# Patient Record
Sex: MALE | Race: WHITE | NOT HISPANIC OR LATINO | Employment: UNEMPLOYED | URBAN - NONMETROPOLITAN AREA
[De-identification: names, ages, dates, MRNs, and addresses within clinical notes are randomized per-mention and may not be internally consistent; named-entity substitution may affect disease eponyms.]

---

## 2018-06-29 ENCOUNTER — HOSPITAL ENCOUNTER (EMERGENCY)
Facility: HOSPITAL | Age: 48
Discharge: HOME | End: 2018-06-29
Attending: EMERGENCY MEDICINE | Admitting: EMERGENCY MEDICINE

## 2018-06-29 VITALS
TEMPERATURE: 97.5 F | HEART RATE: 86 BPM | OXYGEN SATURATION: 96 % | RESPIRATION RATE: 18 BRPM | SYSTOLIC BLOOD PRESSURE: 141 MMHG | BODY MASS INDEX: 24.38 KG/M2 | HEIGHT: 72 IN | WEIGHT: 180 LBS | DIASTOLIC BLOOD PRESSURE: 93 MMHG

## 2018-06-29 DIAGNOSIS — L03.113 CELLULITIS OF RIGHT UPPER EXTREMITY: Primary | ICD-10-CM

## 2018-06-29 PROCEDURE — 99283 EMERGENCY DEPT VISIT LOW MDM: CPT

## 2018-06-29 RX ORDER — MONTELUKAST SODIUM 10 MG/1
10 TABLET ORAL NIGHTLY
COMMUNITY
End: 2022-01-18

## 2018-06-29 RX ORDER — OMEPRAZOLE 40 MG/1
40 CAPSULE, DELAYED RELEASE ORAL DAILY
COMMUNITY
End: 2022-01-18

## 2018-06-29 RX ORDER — MELOXICAM 15 MG/1
15 TABLET ORAL DAILY
COMMUNITY
End: 2022-01-18

## 2018-06-29 RX ORDER — RANITIDINE 150 MG/1
150 TABLET ORAL 2 TIMES DAILY
COMMUNITY
End: 2022-01-18

## 2018-06-29 RX ORDER — DOXYCYCLINE 100 MG/1
100 CAPSULE ORAL 2 TIMES DAILY
Qty: 20 CAPSULE | Refills: 0 | Status: SHIPPED | OUTPATIENT
Start: 2018-06-29 | End: 2018-07-09

## 2018-06-29 RX ORDER — ALBUTEROL SULFATE 90 UG/1
2 AEROSOL, METERED RESPIRATORY (INHALATION) EVERY 4 HOURS PRN
COMMUNITY
End: 2019-11-02 | Stop reason: SDUPTHER

## 2018-06-29 RX ORDER — BRIMONIDINE TARTRATE AND TIMOLOL MALEATE 2; 5 MG/ML; MG/ML
SOLUTION OPHTHALMIC EVERY 12 HOURS
COMMUNITY
End: 2022-01-18

## 2018-09-13 DIAGNOSIS — R06.02 SOB (SHORTNESS OF BREATH): Primary | ICD-10-CM

## 2018-09-17 DIAGNOSIS — R06.02 SOB (SHORTNESS OF BREATH): Primary | ICD-10-CM

## 2018-09-20 ENCOUNTER — OFFICE VISIT (OUTPATIENT)
Dept: PULMONOLOGY | Facility: CLINIC | Age: 48
End: 2018-09-20

## 2018-09-20 VITALS
WEIGHT: 178 LBS | OXYGEN SATURATION: 98 % | BODY MASS INDEX: 24.11 KG/M2 | HEIGHT: 72 IN | SYSTOLIC BLOOD PRESSURE: 120 MMHG | HEART RATE: 82 BPM | DIASTOLIC BLOOD PRESSURE: 79 MMHG

## 2018-09-20 DIAGNOSIS — J44.9 CHRONIC OBSTRUCTIVE PULMONARY DISEASE, UNSPECIFIED COPD TYPE (HCC): Primary | ICD-10-CM

## 2018-09-20 LAB
FEV1: 0.88 LITERS
FVC VOL RESPIRATORY: 1.31 LITERS

## 2018-09-20 PROCEDURE — 94010 BREATHING CAPACITY TEST: CPT | Performed by: INTERNAL MEDICINE

## 2018-09-20 PROCEDURE — 99203 OFFICE O/P NEW LOW 30 MIN: CPT | Performed by: INTERNAL MEDICINE

## 2018-09-20 ASSESSMENT — PULMONARY FUNCTION TESTS
FEV1: 0.88
FVC: 1.31

## 2018-09-20 NOTE — PROGRESS NOTES
History of Present Illness 48-year-old male Referred for evaluation of COPD He came along with his wife smoking up to 2 packs a day For the over 30 years he was given oxygen 3 month ago and has been taking Singular Albuterol inhaler and Combivent they Don't seem to be helping.  Taking Lopressor for blood pressure and Prilosec for acid reflux      Review of Systems daily cough some expectoration shortness of breath even at rest no chest pain palpitation and weight loss or edema    Active Problems:  Problem List Items Addressed This Visit     None      Visit Diagnoses     Chronic obstructive pulmonary disease, unspecified COPD type (CMS/HCC)    -  Primary    Relevant Medications    ipratropium-albuterol (COMBIVENT RESPIMAT)  MCG/ACT inhaler          Past Medical History:  Past Medical History:   Diagnosis Date   • Hypertension    • Seizures (CMS/HCC)        Family History:  History reviewed. No pertinent family history.    Social History:  Social History   Substance Use Topics   • Smoking status: Current Every Day Smoker     Packs/day: 1.00     Years: 35.00     Types: Cigarettes   • Smokeless tobacco: Never Used   • Alcohol use No       Current Medications:  Current Outpatient Prescriptions   Medication Sig Dispense Refill   • albuterol (PROVENTIL HFA;VENTOLIN HFA) 108 (90 Base) MCG/ACT inhaler Inhale 2 puffs Every 4 (Four) Hours As Needed for Wheezing.     • brimonidine-timolol (COMBIGAN) 0.2-0.5 % ophthalmic solution Every 12 (Twelve) Hours.     • ipratropium-albuterol (COMBIVENT RESPIMAT)  MCG/ACT inhaler Inhale 1 puff 4 (Four) Times a Day As Needed for Wheezing.     • meloxicam (MOBIC) 15 MG tablet Take 15 mg by mouth Daily.     • metoprolol tartrate (LOPRESSOR) 25 MG tablet Take 25 mg by mouth 2 (Two) Times a Day.     • montelukast (SINGULAIR) 10 MG tablet Take 10 mg by mouth Every Night.     • omeprazole (priLOSEC) 40 MG capsule Take 40 mg by mouth Daily.     • raNITIdine (ZANTAC) 150 MG tablet Take  "150 mg by mouth 2 (Two) Times a Day.       No current facility-administered medications for this visit.        Allergies:  Allergies   Allergen Reactions   • Penicillins Anaphylaxis       Vitals:  /79   Pulse 82   Ht 182.9 cm (72\")   Wt 80.7 kg (178 lb)   SpO2 98%   BMI 24.14 kg/m²     Physical Exam no acute distress vital signs is stable O2 sat 98% on room air lungs hyperinflated with decreased breath sounds heart regular no clubbing cyanosis or edema    Imaging: Chest x-ray before the visit and sent him to get a chest x-ray instructed the wife that we need to get an x-ray to make sure that he does not of lung cancer    PFT: Question of effort however FVC is 24 and FEV1 20% indicating severe obstruction  Results for orders placed or performed during the hospital encounter of 10/31/15   Sedimentation rate   Result Value Ref Range    Sed Rate 22 (H) 0 - 15 mm/hr   C-reactive protein   Result Value Ref Range    C-Reactive Protein 2.93 (H) 0.00 - 0.99 mg/dL   CBC and Differential   Result Value Ref Range    WBC 10.4 4.5 - 12.5 K/Cumm    RBC 4.50 (L) 4.70 - 6.10 Million    Hemoglobin 13.9 (L) 14.0 - 18.0 g/dL    Hematocrit 42.6 42.0 - 52.0 %    MCV 94.7 (H) 80.0 - 94.0 fL    MCH 30.9 27.0 - 33.0 pg    MCHC 32.6 (L) 33.0 - 37.0 g/dL    Platelets 167 130 - 400 K/Cumm    RDW 13.4 11.5 - 14.5 %    MPV 12.6 (H) 6.0 - 10.0 fL    Neutrophil Rel % 62.6 30.0 - 70.0 %    Lymphocyte Rel % 28.3 21.0 - 51.0 %    Monocyte Rel % 5.9 0.0 - 10.0 %    Eosinophil Rel % 2.7 0.0 - 5.0 %    Basophil Rel % 0.4 0.0 - 2.0 %    Immature Granulocyte Rel % 0.10 0.00 - 0.43 %    Neutrophils Absolute 6.5 1.4 - 6.5 K/Cumm    Lymphocytes Absolute 2.9 1.0 - 3.0 K/Cumm    Monocytes Absolute 0.6 0.1 - 0.9 K/Cumm    Eosinophils Absolute 0.3 0.0 - 0.7 K/Cumm    Basophils Absolute 0.0 0.0 - 0.3 K/Cumm    Abs Imm Gran 0.010 0.000 - 0.031 K/Cumm           ASSESSMENT AND DIAGNOSIS: COPD seemingly severe  Recommendation strongly urged him to quit " smoking he currently smokes 1 pack a day with a prescription for NicoDerm patches take 21 mg once a day Ultram for singular theophylline 600 Symbicort and incruse and albuterol inhaler.  Instructed him to get annual flu vaccine and pneumonia vaccine per protocol .  The wife is also a smoker and does not seem to be Motivated to quit or be  supportive of her  to quit.      Follow-Up: Return in 1 week and with a PFT and to review the Chest x-ray and reemphasized the need for smoking cessation

## 2019-09-21 ENCOUNTER — HOSPITAL ENCOUNTER (EMERGENCY)
Facility: HOSPITAL | Age: 49
Discharge: HOME | End: 2019-09-21
Attending: FAMILY MEDICINE | Admitting: FAMILY MEDICINE

## 2019-09-21 ENCOUNTER — APPOINTMENT (OUTPATIENT)
Dept: CT IMAGING | Facility: HOSPITAL | Age: 49
End: 2019-09-21

## 2019-09-21 ENCOUNTER — APPOINTMENT (OUTPATIENT)
Dept: GENERAL RADIOLOGY | Facility: HOSPITAL | Age: 49
End: 2019-09-21

## 2019-09-21 VITALS
HEIGHT: 68 IN | TEMPERATURE: 99.4 F | OXYGEN SATURATION: 97 % | WEIGHT: 162 LBS | RESPIRATION RATE: 20 BRPM | BODY MASS INDEX: 24.55 KG/M2 | DIASTOLIC BLOOD PRESSURE: 72 MMHG | SYSTOLIC BLOOD PRESSURE: 119 MMHG | HEART RATE: 85 BPM

## 2019-09-21 DIAGNOSIS — R03.0 ELEVATED BLOOD PRESSURE READING: ICD-10-CM

## 2019-09-21 DIAGNOSIS — J20.9 ACUTE BRONCHITIS, UNSPECIFIED ORGANISM: Primary | ICD-10-CM

## 2019-09-21 LAB
ANION GAP SERPL CALCULATED.3IONS-SCNC: 12.8 MMOL/L (ref 5–15)
BASOPHILS # BLD AUTO: 0.04 10*3/MM3 (ref 0–0.2)
BASOPHILS NFR BLD AUTO: 0.2 % (ref 0–1.5)
BUN BLD-MCNC: 12 MG/DL (ref 6–20)
BUN/CREAT SERPL: 18.5 (ref 7–25)
CALCIUM SPEC-SCNC: 9.5 MG/DL (ref 8.6–10.5)
CHLORIDE SERPL-SCNC: 101 MMOL/L (ref 98–107)
CO2 SERPL-SCNC: 23.2 MMOL/L (ref 22–29)
CREAT BLD-MCNC: 0.65 MG/DL (ref 0.76–1.27)
D-LACTATE SERPL-SCNC: 1.3 MMOL/L (ref 0.5–2)
DEPRECATED RDW RBC AUTO: 43.4 FL (ref 37–54)
EOSINOPHIL # BLD AUTO: 0.22 10*3/MM3 (ref 0–0.4)
EOSINOPHIL NFR BLD AUTO: 1.4 % (ref 0.3–6.2)
ERYTHROCYTE [DISTWIDTH] IN BLOOD BY AUTOMATED COUNT: 13.3 % (ref 12.3–15.4)
FLUAV AG NPH QL: NEGATIVE
FLUBV AG NPH QL IA: NEGATIVE
GFR SERPL CREATININE-BSD FRML MDRD: 132 ML/MIN/1.73
GLUCOSE BLD-MCNC: 127 MG/DL (ref 65–99)
HCT VFR BLD AUTO: 40.7 % (ref 37.5–51)
HGB BLD-MCNC: 13.5 G/DL (ref 13–17.7)
IMM GRANULOCYTES # BLD AUTO: 0.05 10*3/MM3 (ref 0–0.05)
IMM GRANULOCYTES NFR BLD AUTO: 0.3 % (ref 0–0.5)
LYMPHOCYTES # BLD AUTO: 2.15 10*3/MM3 (ref 0.7–3.1)
LYMPHOCYTES NFR BLD AUTO: 13.3 % (ref 19.6–45.3)
MCH RBC QN AUTO: 30.9 PG (ref 26.6–33)
MCHC RBC AUTO-ENTMCNC: 33.2 G/DL (ref 31.5–35.7)
MCV RBC AUTO: 93.1 FL (ref 79–97)
MONOCYTES # BLD AUTO: 1.02 10*3/MM3 (ref 0.1–0.9)
MONOCYTES NFR BLD AUTO: 6.3 % (ref 5–12)
NEUTROPHILS # BLD AUTO: 12.66 10*3/MM3 (ref 1.7–7)
NEUTROPHILS NFR BLD AUTO: 78.5 % (ref 42.7–76)
PLATELET # BLD AUTO: 230 10*3/MM3 (ref 140–450)
PMV BLD AUTO: 11.6 FL (ref 6–12)
POTASSIUM BLD-SCNC: 4.3 MMOL/L (ref 3.5–5.2)
RBC # BLD AUTO: 4.37 10*6/MM3 (ref 4.14–5.8)
SODIUM BLD-SCNC: 137 MMOL/L (ref 136–145)
WBC NRBC COR # BLD: 16.14 10*3/MM3 (ref 3.4–10.8)

## 2019-09-21 PROCEDURE — 87804 INFLUENZA ASSAY W/OPTIC: CPT | Performed by: PHYSICIAN ASSISTANT

## 2019-09-21 PROCEDURE — 71046 X-RAY EXAM CHEST 2 VIEWS: CPT | Performed by: RADIOLOGY

## 2019-09-21 PROCEDURE — 25010000002 CEFTRIAXONE: Performed by: PHYSICIAN ASSISTANT

## 2019-09-21 PROCEDURE — 0 IOVERSOL 74 % SOLUTION: Performed by: FAMILY MEDICINE

## 2019-09-21 PROCEDURE — 99283 EMERGENCY DEPT VISIT LOW MDM: CPT

## 2019-09-21 PROCEDURE — 36415 COLL VENOUS BLD VENIPUNCTURE: CPT

## 2019-09-21 PROCEDURE — 87040 BLOOD CULTURE FOR BACTERIA: CPT | Performed by: PHYSICIAN ASSISTANT

## 2019-09-21 PROCEDURE — 83605 ASSAY OF LACTIC ACID: CPT | Performed by: PHYSICIAN ASSISTANT

## 2019-09-21 PROCEDURE — 71275 CT ANGIOGRAPHY CHEST: CPT | Performed by: RADIOLOGY

## 2019-09-21 PROCEDURE — 94799 UNLISTED PULMONARY SVC/PX: CPT

## 2019-09-21 PROCEDURE — 25010000002 METHYLPREDNISOLONE PER 125 MG: Performed by: PHYSICIAN ASSISTANT

## 2019-09-21 PROCEDURE — 71046 X-RAY EXAM CHEST 2 VIEWS: CPT

## 2019-09-21 PROCEDURE — 71275 CT ANGIOGRAPHY CHEST: CPT

## 2019-09-21 PROCEDURE — 94640 AIRWAY INHALATION TREATMENT: CPT

## 2019-09-21 PROCEDURE — 96365 THER/PROPH/DIAG IV INF INIT: CPT

## 2019-09-21 PROCEDURE — 96375 TX/PRO/DX INJ NEW DRUG ADDON: CPT

## 2019-09-21 PROCEDURE — 80048 BASIC METABOLIC PNL TOTAL CA: CPT | Performed by: PHYSICIAN ASSISTANT

## 2019-09-21 PROCEDURE — 85025 COMPLETE CBC W/AUTO DIFF WBC: CPT | Performed by: PHYSICIAN ASSISTANT

## 2019-09-21 RX ORDER — AZITHROMYCIN 250 MG/1
250 TABLET, FILM COATED ORAL DAILY
Qty: 6 TABLET | Refills: 0 | OUTPATIENT
Start: 2019-09-21 | End: 2019-11-02

## 2019-09-21 RX ORDER — SODIUM CHLORIDE 0.9 % (FLUSH) 0.9 %
10 SYRINGE (ML) INJECTION AS NEEDED
Status: DISCONTINUED | OUTPATIENT
Start: 2019-09-21 | End: 2019-09-21 | Stop reason: HOSPADM

## 2019-09-21 RX ORDER — BENZONATATE 100 MG/1
100 CAPSULE ORAL 3 TIMES DAILY PRN
Qty: 21 CAPSULE | Refills: 0 | Status: SHIPPED | OUTPATIENT
Start: 2019-09-21 | End: 2019-11-02 | Stop reason: SDUPTHER

## 2019-09-21 RX ORDER — IPRATROPIUM BROMIDE AND ALBUTEROL SULFATE 2.5; .5 MG/3ML; MG/3ML
3 SOLUTION RESPIRATORY (INHALATION) ONCE
Status: COMPLETED | OUTPATIENT
Start: 2019-09-21 | End: 2019-09-21

## 2019-09-21 RX ORDER — METHYLPREDNISOLONE SODIUM SUCCINATE 125 MG/2ML
125 INJECTION, POWDER, LYOPHILIZED, FOR SOLUTION INTRAMUSCULAR; INTRAVENOUS ONCE
Status: COMPLETED | OUTPATIENT
Start: 2019-09-21 | End: 2019-09-21

## 2019-09-21 RX ADMIN — CEFTRIAXONE 1 G: 1 INJECTION, POWDER, FOR SOLUTION INTRAMUSCULAR; INTRAVENOUS at 12:59

## 2019-09-21 RX ADMIN — IOVERSOL 90 ML: 741 INJECTION INTRA-ARTERIAL; INTRAVENOUS at 13:17

## 2019-09-21 RX ADMIN — METHYLPREDNISOLONE SODIUM SUCCINATE 125 MG: 125 INJECTION, POWDER, FOR SOLUTION INTRAMUSCULAR; INTRAVENOUS at 12:59

## 2019-09-21 RX ADMIN — IPRATROPIUM BROMIDE AND ALBUTEROL SULFATE 3 ML: .5; 3 SOLUTION RESPIRATORY (INHALATION) at 11:39
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
## 2019-09-21 NOTE — ED NOTES
Patient has returned from CT and has been hooked back up pulse ox, BP, and cardiac monitoring at this time.      Sue Hawk RN  09/21/19 1970

## 2019-09-26 LAB
BACTERIA SPEC AEROBE CULT: NORMAL
BACTERIA SPEC AEROBE CULT: NORMAL

## 2019-11-02 ENCOUNTER — APPOINTMENT (OUTPATIENT)
Dept: GENERAL RADIOLOGY | Facility: HOSPITAL | Age: 49
End: 2019-11-02

## 2019-11-02 ENCOUNTER — HOSPITAL ENCOUNTER (EMERGENCY)
Facility: HOSPITAL | Age: 49
Discharge: HOME | End: 2019-11-02
Attending: EMERGENCY MEDICINE | Admitting: EMERGENCY MEDICINE

## 2019-11-02 VITALS
HEIGHT: 66 IN | SYSTOLIC BLOOD PRESSURE: 148 MMHG | BODY MASS INDEX: 26.03 KG/M2 | OXYGEN SATURATION: 96 % | RESPIRATION RATE: 18 BRPM | HEART RATE: 88 BPM | DIASTOLIC BLOOD PRESSURE: 89 MMHG | TEMPERATURE: 98.6 F | WEIGHT: 162 LBS

## 2019-11-02 DIAGNOSIS — J44.1 COPD EXACERBATION (HCC): Primary | ICD-10-CM

## 2019-11-02 LAB
A-A DO2: 26.4 MMHG (ref 0–300)
ALBUMIN SERPL-MCNC: 4.79 G/DL (ref 3.5–5.2)
ALBUMIN/GLOB SERPL: 1.3 G/DL
ALP SERPL-CCNC: 81 U/L (ref 39–117)
ALT SERPL W P-5'-P-CCNC: 44 U/L (ref 1–41)
ANION GAP SERPL CALCULATED.3IONS-SCNC: 11.5 MMOL/L (ref 5–15)
APTT PPP: 27.5 SECONDS (ref 23.8–36.1)
ARTERIAL PATENCY WRIST A: ABNORMAL
AST SERPL-CCNC: 30 U/L (ref 1–40)
ATMOSPHERIC PRESS: 734 MMHG
B PERT DNA SPEC QL NAA+PROBE: NOT DETECTED
BASE EXCESS BLDA CALC-SCNC: -0.7 MMOL/L (ref 0–2)
BASOPHILS # BLD AUTO: 0.05 10*3/MM3 (ref 0–0.2)
BASOPHILS NFR BLD AUTO: 0.5 % (ref 0–1.5)
BDY SITE: ABNORMAL
BILIRUB SERPL-MCNC: 0.3 MG/DL (ref 0.2–1.2)
BODY TEMPERATURE: 0 C
BUN BLD-MCNC: 10 MG/DL (ref 6–20)
BUN/CREAT SERPL: 14.9 (ref 7–25)
C PNEUM DNA NPH QL NAA+NON-PROBE: NOT DETECTED
CALCIUM SPEC-SCNC: 10.4 MG/DL (ref 8.6–10.5)
CHLORIDE SERPL-SCNC: 101 MMOL/L (ref 98–107)
CO2 BLDA-SCNC: 25.3 MMOL/L (ref 22–33)
CO2 SERPL-SCNC: 24.5 MMOL/L (ref 22–29)
COHGB MFR BLD: 4.8 % (ref 0–5)
CREAT BLD-MCNC: 0.67 MG/DL (ref 0.76–1.27)
DEPRECATED RDW RBC AUTO: 47.8 FL (ref 37–54)
EOSINOPHIL # BLD AUTO: 0.29 10*3/MM3 (ref 0–0.4)
EOSINOPHIL NFR BLD AUTO: 3.1 % (ref 0.3–6.2)
ERYTHROCYTE [DISTWIDTH] IN BLOOD BY AUTOMATED COUNT: 13.8 % (ref 12.3–15.4)
FLUAV H1 2009 PAND RNA NPH QL NAA+PROBE: NOT DETECTED
FLUAV H1 HA GENE NPH QL NAA+PROBE: NOT DETECTED
FLUAV H3 RNA NPH QL NAA+PROBE: NOT DETECTED
FLUAV SUBTYP SPEC NAA+PROBE: NOT DETECTED
FLUBV RNA ISLT QL NAA+PROBE: NOT DETECTED
GFR SERPL CREATININE-BSD FRML MDRD: 127 ML/MIN/1.73
GLOBULIN UR ELPH-MCNC: 3.8 GM/DL
GLUCOSE BLD-MCNC: 103 MG/DL (ref 65–99)
HADV DNA SPEC NAA+PROBE: NOT DETECTED
HCO3 BLDA-SCNC: 24.1 MMOL/L (ref 20–26)
HCOV 229E RNA SPEC QL NAA+PROBE: NOT DETECTED
HCOV HKU1 RNA SPEC QL NAA+PROBE: NOT DETECTED
HCOV NL63 RNA SPEC QL NAA+PROBE: NOT DETECTED
HCOV OC43 RNA SPEC QL NAA+PROBE: NOT DETECTED
HCT VFR BLD AUTO: 43.3 % (ref 37.5–51)
HCT VFR BLD CALC: 44.5 % (ref 38–51)
HGB BLD-MCNC: 14.2 G/DL (ref 13–17.7)
HGB BLDA-MCNC: 14.5 G/DL (ref 14–18)
HMPV RNA NPH QL NAA+NON-PROBE: NOT DETECTED
HOROWITZ INDEX BLD+IHG-RTO: 21 %
HPIV1 RNA SPEC QL NAA+PROBE: NOT DETECTED
HPIV2 RNA SPEC QL NAA+PROBE: NOT DETECTED
HPIV3 RNA NPH QL NAA+PROBE: NOT DETECTED
HPIV4 P GENE NPH QL NAA+PROBE: NOT DETECTED
IMM GRANULOCYTES # BLD AUTO: 0.02 10*3/MM3 (ref 0–0.05)
IMM GRANULOCYTES NFR BLD AUTO: 0.2 % (ref 0–0.5)
INR PPP: 0.91 (ref 0.9–1.1)
LYMPHOCYTES # BLD AUTO: 3.32 10*3/MM3 (ref 0.7–3.1)
LYMPHOCYTES NFR BLD AUTO: 35.4 % (ref 19.6–45.3)
Lab: ABNORMAL
M PNEUMO IGG SER IA-ACNC: NOT DETECTED
MCH RBC QN AUTO: 30.8 PG (ref 26.6–33)
MCHC RBC AUTO-ENTMCNC: 32.8 G/DL (ref 31.5–35.7)
MCV RBC AUTO: 93.9 FL (ref 79–97)
METHGB BLD QL: 0 % (ref 0–3)
MODALITY: ABNORMAL
MONOCYTES # BLD AUTO: 0.58 10*3/MM3 (ref 0.1–0.9)
MONOCYTES NFR BLD AUTO: 6.2 % (ref 5–12)
NEUTROPHILS # BLD AUTO: 5.13 10*3/MM3 (ref 1.7–7)
NEUTROPHILS NFR BLD AUTO: 54.6 % (ref 42.7–76)
NOTE: ABNORMAL
NRBC BLD AUTO-RTO: 0 /100 WBC (ref 0–0.2)
OXYHGB MFR BLDV: 91 % (ref 94–99)
PCO2 BLDA: 39.3 MM HG (ref 35–45)
PCO2 TEMP ADJ BLD: ABNORMAL MM[HG]
PH BLDA: 7.4 PH UNITS (ref 7.35–7.45)
PH, TEMP CORRECTED: ABNORMAL
PLATELET # BLD AUTO: 302 10*3/MM3 (ref 140–450)
PMV BLD AUTO: 11.2 FL (ref 6–12)
PO2 BLDA: 73.4 MM HG (ref 83–108)
PO2 TEMP ADJ BLD: ABNORMAL MM[HG]
POTASSIUM BLD-SCNC: 4.4 MMOL/L (ref 3.5–5.2)
PROT SERPL-MCNC: 8.6 G/DL (ref 6–8.5)
PROTHROMBIN TIME: 12.7 SECONDS (ref 11–15.4)
RBC # BLD AUTO: 4.61 10*6/MM3 (ref 4.14–5.8)
RHINOVIRUS RNA SPEC NAA+PROBE: DETECTED
RSV RNA NPH QL NAA+NON-PROBE: NOT DETECTED
SAO2 % BLDCOA: 95.6 % (ref 94–99)
SODIUM BLD-SCNC: 137 MMOL/L (ref 136–145)
VENTILATOR MODE: ABNORMAL
WBC NRBC COR # BLD: 9.39 10*3/MM3 (ref 3.4–10.8)

## 2019-11-02 PROCEDURE — 94799 UNLISTED PULMONARY SVC/PX: CPT

## 2019-11-02 PROCEDURE — 96374 THER/PROPH/DIAG INJ IV PUSH: CPT

## 2019-11-02 PROCEDURE — 80053 COMPREHEN METABOLIC PANEL: CPT | Performed by: PHYSICIAN ASSISTANT

## 2019-11-02 PROCEDURE — 36600 WITHDRAWAL OF ARTERIAL BLOOD: CPT

## 2019-11-02 PROCEDURE — 99284 EMERGENCY DEPT VISIT MOD MDM: CPT

## 2019-11-02 PROCEDURE — 85730 THROMBOPLASTIN TIME PARTIAL: CPT | Performed by: PHYSICIAN ASSISTANT

## 2019-11-02 PROCEDURE — 82805 BLOOD GASES W/O2 SATURATION: CPT

## 2019-11-02 PROCEDURE — 93005 ELECTROCARDIOGRAM TRACING: CPT | Performed by: EMERGENCY MEDICINE

## 2019-11-02 PROCEDURE — 87070 CULTURE OTHR SPECIMN AEROBIC: CPT | Performed by: PHYSICIAN ASSISTANT

## 2019-11-02 PROCEDURE — 83050 HGB METHEMOGLOBIN QUAN: CPT

## 2019-11-02 PROCEDURE — 0099U HC BIOFIRE FILMARRAY RESP PANEL 1: CPT | Performed by: PHYSICIAN ASSISTANT

## 2019-11-02 PROCEDURE — 87205 SMEAR GRAM STAIN: CPT | Performed by: PHYSICIAN ASSISTANT

## 2019-11-02 PROCEDURE — 94640 AIRWAY INHALATION TREATMENT: CPT

## 2019-11-02 PROCEDURE — 25010000002 METHYLPREDNISOLONE PER 125 MG: Performed by: PHYSICIAN ASSISTANT

## 2019-11-02 PROCEDURE — 71046 X-RAY EXAM CHEST 2 VIEWS: CPT | Performed by: RADIOLOGY

## 2019-11-02 PROCEDURE — 85025 COMPLETE CBC W/AUTO DIFF WBC: CPT | Performed by: PHYSICIAN ASSISTANT

## 2019-11-02 PROCEDURE — 85610 PROTHROMBIN TIME: CPT | Performed by: PHYSICIAN ASSISTANT

## 2019-11-02 PROCEDURE — 82375 ASSAY CARBOXYHB QUANT: CPT

## 2019-11-02 PROCEDURE — 71046 X-RAY EXAM CHEST 2 VIEWS: CPT

## 2019-11-02 RX ORDER — DOXYCYCLINE 100 MG/1
100 CAPSULE ORAL 2 TIMES DAILY
Qty: 20 CAPSULE | Refills: 0 | OUTPATIENT
Start: 2019-11-02 | End: 2020-11-12

## 2019-11-02 RX ORDER — BENZONATATE 100 MG/1
100 CAPSULE ORAL 3 TIMES DAILY PRN
Qty: 20 CAPSULE | Refills: 0 | Status: SHIPPED | OUTPATIENT
Start: 2019-11-02 | End: 2022-01-18

## 2019-11-02 RX ORDER — IPRATROPIUM BROMIDE AND ALBUTEROL SULFATE 2.5; .5 MG/3ML; MG/3ML
3 SOLUTION RESPIRATORY (INHALATION) ONCE
Status: COMPLETED | OUTPATIENT
Start: 2019-11-02 | End: 2019-11-02

## 2019-11-02 RX ORDER — BENZONATATE 100 MG/1
100 CAPSULE ORAL ONCE
Status: COMPLETED | OUTPATIENT
Start: 2019-11-02 | End: 2019-11-02

## 2019-11-02 RX ORDER — SODIUM CHLORIDE 0.9 % (FLUSH) 0.9 %
10 SYRINGE (ML) INJECTION AS NEEDED
Status: DISCONTINUED | OUTPATIENT
Start: 2019-11-02 | End: 2019-11-02 | Stop reason: HOSPADM

## 2019-11-02 RX ORDER — METHYLPREDNISOLONE SODIUM SUCCINATE 125 MG/2ML
80 INJECTION, POWDER, LYOPHILIZED, FOR SOLUTION INTRAMUSCULAR; INTRAVENOUS ONCE
Status: COMPLETED | OUTPATIENT
Start: 2019-11-02 | End: 2019-11-02

## 2019-11-02 RX ORDER — ALBUTEROL SULFATE 90 UG/1
2 AEROSOL, METERED RESPIRATORY (INHALATION) EVERY 4 HOURS PRN
Qty: 18 G | Refills: 0 | Status: SHIPPED | OUTPATIENT
Start: 2019-11-02

## 2019-11-02 RX ORDER — METHYLPREDNISOLONE 4 MG/1
TABLET ORAL
Qty: 21 TABLET | Refills: 0 | Status: SHIPPED | OUTPATIENT
Start: 2019-11-02 | End: 2022-01-18

## 2019-11-02 RX ADMIN — IPRATROPIUM BROMIDE AND ALBUTEROL SULFATE 3 ML: .5; 3 SOLUTION RESPIRATORY (INHALATION) at 12:55

## 2019-11-02 RX ADMIN — BENZONATATE 100 MG: 100 CAPSULE ORAL at 14:59

## 2019-11-02 RX ADMIN — METHYLPREDNISOLONE SODIUM SUCCINATE 80 MG: 125 INJECTION, POWDER, FOR SOLUTION INTRAMUSCULAR; INTRAVENOUS at 13:26
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
## 2019-11-04 LAB
BACTERIA SPEC RESP CULT: NORMAL
GRAM STN SPEC: NORMAL

## 2019-11-23 VITALS
DIASTOLIC BLOOD PRESSURE: 89 MMHG | BODY MASS INDEX: 26.52 KG/M2 | WEIGHT: 165 LBS | SYSTOLIC BLOOD PRESSURE: 142 MMHG | OXYGEN SATURATION: 98 % | RESPIRATION RATE: 20 BRPM | HEART RATE: 101 BPM | TEMPERATURE: 98.3 F | HEIGHT: 66 IN

## 2019-11-23 PROCEDURE — 99283 EMERGENCY DEPT VISIT LOW MDM: CPT

## 2019-11-23 PROCEDURE — 96372 THER/PROPH/DIAG INJ SC/IM: CPT

## 2019-11-24 ENCOUNTER — HOSPITAL ENCOUNTER (EMERGENCY)
Facility: HOSPITAL | Age: 49
Discharge: HOME | End: 2019-11-24
Attending: EMERGENCY MEDICINE | Admitting: EMERGENCY MEDICINE

## 2019-11-24 DIAGNOSIS — H65.05 RECURRENT ACUTE SEROUS OTITIS MEDIA OF LEFT EAR: Primary | ICD-10-CM

## 2019-11-24 PROCEDURE — 25010000002 CEFTRIAXONE PER 250 MG: Performed by: EMERGENCY MEDICINE

## 2019-11-24 PROCEDURE — 96372 THER/PROPH/DIAG INJ SC/IM: CPT

## 2019-11-24 RX ORDER — OXYCODONE AND ACETAMINOPHEN 10; 325 MG/1; MG/1
1 TABLET ORAL ONCE
Status: COMPLETED | OUTPATIENT
Start: 2019-11-24 | End: 2019-11-24

## 2019-11-24 RX ORDER — CEFDINIR 300 MG/1
300 CAPSULE ORAL 2 TIMES DAILY
Qty: 20 CAPSULE | Refills: 0 | OUTPATIENT
Start: 2019-11-24 | End: 2020-11-12

## 2019-11-24 RX ORDER — LIDOCAINE HYDROCHLORIDE 10 MG/ML
2.1 INJECTION, SOLUTION EPIDURAL; INFILTRATION; INTRACAUDAL; PERINEURAL ONCE
Status: COMPLETED | OUTPATIENT
Start: 2019-11-24 | End: 2019-11-24

## 2019-11-24 RX ORDER — CEFTRIAXONE 1 G/1
1000 INJECTION, POWDER, FOR SOLUTION INTRAMUSCULAR; INTRAVENOUS ONCE
Status: COMPLETED | OUTPATIENT
Start: 2019-11-24 | End: 2019-11-24

## 2019-11-24 RX ORDER — CIPROFLOXACIN AND DEXAMETHASONE 3; 1 MG/ML; MG/ML
4 SUSPENSION/ DROPS AURICULAR (OTIC) 2 TIMES DAILY
Qty: 7.5 ML | Refills: 0 | Status: SHIPPED | OUTPATIENT
Start: 2019-11-24 | End: 2019-12-01

## 2019-11-24 RX ORDER — ONDANSETRON 4 MG/1
4 TABLET, ORALLY DISINTEGRATING ORAL ONCE
Status: COMPLETED | OUTPATIENT
Start: 2019-11-24 | End: 2019-11-24

## 2019-11-24 RX ADMIN — ONDANSETRON 4 MG: 4 TABLET, ORALLY DISINTEGRATING ORAL at 00:33

## 2019-11-24 RX ADMIN — CEFTRIAXONE 1000 MG: 1 INJECTION, POWDER, FOR SOLUTION INTRAMUSCULAR; INTRAVENOUS at 00:32

## 2019-11-24 RX ADMIN — OXYCODONE HYDROCHLORIDE AND ACETAMINOPHEN 1 TABLET: 10; 325 TABLET ORAL at 00:33

## 2019-11-24 RX ADMIN — LIDOCAINE HYDROCHLORIDE 2.1 ML: 10 INJECTION, SOLUTION EPIDURAL; INFILTRATION; INTRACAUDAL; PERINEURAL at 00:33
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
## 2020-07-11 PROCEDURE — 99283 EMERGENCY DEPT VISIT LOW MDM: CPT

## 2020-07-12 ENCOUNTER — HOSPITAL ENCOUNTER (EMERGENCY)
Facility: HOSPITAL | Age: 50
Discharge: HOME | End: 2020-07-12
Attending: EMERGENCY MEDICINE | Admitting: EMERGENCY MEDICINE

## 2020-07-12 VITALS
HEART RATE: 89 BPM | BODY MASS INDEX: 26.03 KG/M2 | OXYGEN SATURATION: 98 % | WEIGHT: 162 LBS | RESPIRATION RATE: 16 BRPM | DIASTOLIC BLOOD PRESSURE: 86 MMHG | SYSTOLIC BLOOD PRESSURE: 115 MMHG | TEMPERATURE: 98.5 F | HEIGHT: 66 IN
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
## 2020-07-12 PROCEDURE — 90715 TDAP VACCINE 7 YRS/> IM: CPT | Performed by: EMERGENCY MEDICINE

## 2020-07-12 PROCEDURE — 90471 IMMUNIZATION ADMIN: CPT | Performed by: EMERGENCY MEDICINE

## 2020-07-12 PROCEDURE — 25010000002 TDAP 5-2.5-18.5 LF-MCG/0.5 SUSPENSION: Performed by: EMERGENCY MEDICINE

## 2020-07-12 RX ORDER — HYDROCODONE BITARTRATE AND ACETAMINOPHEN 5; 325 MG/1; MG/1
1 TABLET ORAL ONCE
Status: COMPLETED | OUTPATIENT
Start: 2020-07-12 | End: 2020-07-12

## 2020-07-12 RX ORDER — DOXYCYCLINE 100 MG/1
100 CAPSULE ORAL 2 TIMES DAILY
Qty: 20 CAPSULE | Refills: 0 | OUTPATIENT
Start: 2020-07-12 | End: 2020-11-12

## 2020-07-12 RX ORDER — DOXYCYCLINE 100 MG/1
100 CAPSULE ORAL ONCE
Status: COMPLETED | OUTPATIENT
Start: 2020-07-12 | End: 2020-07-12

## 2020-07-12 RX ADMIN — TETANUS TOXOID, REDUCED DIPHTHERIA TOXOID AND ACELLULAR PERTUSSIS VACCINE, ADSORBED 0.5 ML: 5; 2.5; 8; 8; 2.5 SUSPENSION INTRAMUSCULAR at 01:30

## 2020-07-12 RX ADMIN — MUPIROCIN 1 APPLICATION: 20 OINTMENT TOPICAL at 01:30

## 2020-07-12 RX ADMIN — HYDROCODONE BITARTRATE AND ACETAMINOPHEN 1 TABLET: 5; 325 TABLET ORAL at 01:50

## 2020-07-12 RX ADMIN — DOXYCYCLINE 100 MG: 100 CAPSULE ORAL at 01:50

## 2020-07-12 NOTE — ED NOTES
Tetanus vaccine consent obtained at this time, placed on patients chart.      Becky Cagle, RN  07/12/20 0152

## 2020-07-12 NOTE — ED NOTES
Animal bite form filled out at this time; and faxed to [facility] at this time.      Becky Cagle RN  07/12/20 0151

## 2020-07-12 NOTE — ED NOTES
Animal bite noted to have dressing in place, dressing taken off at this time. Dr. Novak aware and at bedside at this time. No drainage or acute distressed. No active bleeding. Becky Deshpande, RN  07/12/20 0122
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
## 2020-07-18 ENCOUNTER — APPOINTMENT (OUTPATIENT)
Dept: GENERAL RADIOLOGY | Facility: HOSPITAL | Age: 50
End: 2020-07-18

## 2020-07-18 ENCOUNTER — HOSPITAL ENCOUNTER (EMERGENCY)
Facility: HOSPITAL | Age: 50
Discharge: HOME | End: 2020-07-18
Attending: EMERGENCY MEDICINE | Admitting: EMERGENCY MEDICINE

## 2020-07-18 VITALS
DIASTOLIC BLOOD PRESSURE: 105 MMHG | HEIGHT: 66 IN | BODY MASS INDEX: 26.03 KG/M2 | RESPIRATION RATE: 20 BRPM | HEART RATE: 84 BPM | SYSTOLIC BLOOD PRESSURE: 151 MMHG | TEMPERATURE: 98.8 F | WEIGHT: 162 LBS | OXYGEN SATURATION: 94 %

## 2020-07-18 DIAGNOSIS — M25.531 RIGHT WRIST PAIN: Primary | ICD-10-CM

## 2020-07-18 PROCEDURE — 99282 EMERGENCY DEPT VISIT SF MDM: CPT

## 2020-07-18 PROCEDURE — 73110 X-RAY EXAM OF WRIST: CPT
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
## 2020-07-18 NOTE — ED NOTES
51 y/o male patient presents to the ED with complaints of right wrist pain. Patient states that he had a dog bite 1 week ago. Patient states that he is still taking his antibiotic but reports pain with movement and swelling. N/V intact.  No redness or swelling on exam. Tenderness to the ulnar aspect of the right wrist.  Full ROM.      Charmaine Fuentes PA  07/18/20 0544

## 2020-07-19 NOTE — ED NOTES
Pt presents to Er with complaints of a dog bite around 4 days ago; reports swelling, pain and tingling in the right hand, wrist area; no swelling noted; has complete ROM of fingers and wrist     Cedric Ramos, RN  07/18/20 2002

## 2020-11-12 ENCOUNTER — HOSPITAL ENCOUNTER (EMERGENCY)
Facility: HOSPITAL | Age: 50
Discharge: HOME | End: 2020-11-12
Attending: EMERGENCY MEDICINE | Admitting: EMERGENCY MEDICINE

## 2020-11-12 VITALS
DIASTOLIC BLOOD PRESSURE: 78 MMHG | SYSTOLIC BLOOD PRESSURE: 154 MMHG | OXYGEN SATURATION: 98 % | BODY MASS INDEX: 24.55 KG/M2 | RESPIRATION RATE: 20 BRPM | TEMPERATURE: 98.3 F | WEIGHT: 162 LBS | HEIGHT: 68 IN | HEART RATE: 78 BPM

## 2020-11-12 DIAGNOSIS — J02.9 PHARYNGITIS, UNSPECIFIED ETIOLOGY: Primary | ICD-10-CM

## 2020-11-12 LAB
FLUAV AG NPH QL: NEGATIVE
FLUBV AG NPH QL IA: NEGATIVE
S PYO AG THROAT QL: NEGATIVE

## 2020-11-12 PROCEDURE — 99283 EMERGENCY DEPT VISIT LOW MDM: CPT

## 2020-11-12 PROCEDURE — 87804 INFLUENZA ASSAY W/OPTIC: CPT | Performed by: PHYSICIAN ASSISTANT

## 2020-11-12 PROCEDURE — U0004 COV-19 TEST NON-CDC HGH THRU: HCPCS | Performed by: PHYSICIAN ASSISTANT

## 2020-11-12 PROCEDURE — 87880 STREP A ASSAY W/OPTIC: CPT | Performed by: PHYSICIAN ASSISTANT

## 2020-11-12 PROCEDURE — C9803 HOPD COVID-19 SPEC COLLECT: HCPCS

## 2020-11-12 PROCEDURE — 87081 CULTURE SCREEN ONLY: CPT | Performed by: PHYSICIAN ASSISTANT

## 2020-11-12 RX ORDER — GUAIFENESIN 600 MG/1
600 TABLET, EXTENDED RELEASE ORAL ONCE
Status: COMPLETED | OUTPATIENT
Start: 2020-11-12 | End: 2020-11-12

## 2020-11-12 RX ORDER — IBUPROFEN 600 MG/1
600 TABLET ORAL EVERY 6 HOURS PRN
Qty: 15 TABLET | Refills: 0 | Status: SHIPPED | OUTPATIENT
Start: 2020-11-12 | End: 2022-01-18

## 2020-11-12 RX ORDER — FAMOTIDINE 20 MG/1
20 TABLET, FILM COATED ORAL ONCE
Status: COMPLETED | OUTPATIENT
Start: 2020-11-12 | End: 2020-11-12

## 2020-11-12 RX ADMIN — GUAIFENESIN 600 MG: 600 TABLET, EXTENDED RELEASE ORAL at 13:21

## 2020-11-12 RX ADMIN — FAMOTIDINE 20 MG: 20 TABLET, FILM COATED ORAL at 13:18

## 2020-11-12 NOTE — DISCHARGE INSTRUCTIONS
Call one of the offices below to establish a primary care provider.  If you are unable to get an appointment and feel it is an emergency and need to be seen immediately please return to the Emergency Department.    Call one of the office below to set up a primary care provider.    Dr. Phillip Jernigan                                                                                                       [address] [facility] [address]  [phone]    Dr. Don, Dr. ALVARO Guerrero, Dr. MAIK Guerrero ([facility])  [address] [facility] [address]  [phone]    Dr. Brown, Dr. Nava, Dr. Pretty ([facility])  [address] [facility] [address]  [phone]    Dr. Archer  [address] [facility] [address]  [phone]    Dr. Silveira, Dr. Mensah, Dr. Ferguson, Dr. Yeung ([facility])  [address] [place] [address] [address]  [facility] [address]  [phone]    Dr. Chiquita Lewis  [address] [facility] [place] [address]  [phone]    Dr. Heavenly Caicedo  [address]   [address]  [facility] [address]  [phone]    Dr. Jernigan  [address] [facility] [address]  [phone]    Dr. Guardado, Dr. Taylor  [address] [facility] [place] [address]  [phone]    Dr. Vazquez  [address]                                                              [address]  [facility] [address]  [phone]    Dr. Renteria  [address] [facility] [address]  [phone]    Dr. Gracie Paulino  [address]  [address]  [place] [address]  [phone]    Dr. Zpaien and [facility]   [address] [facility]  [address]  [place] [address]  [phone]

## 2020-11-12 NOTE — ED NOTES
Leni mora PA-C made aware of patients request for acid reflux medication and that all swab results are finalized.      Clarence Barrow RN  11/12/20 3372

## 2020-11-12 NOTE — ED NOTES
Leni mora PA-C aware of patients request for acid reflux medicine     Clarence Barrow, RN  11/12/20 0394

## 2020-11-13 LAB — SARS-COV-2 RNA RESP QL NAA+PROBE: NORMAL

## 2020-11-14 ENCOUNTER — TELEPHONE (OUTPATIENT)
Dept: EMERGENCY DEPT | Facility: HOSPITAL | Age: 50
End: 2020-11-14

## 2020-11-14 LAB — BACTERIA SPEC AEROBE CULT: NORMAL
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
## 2022-01-18 ENCOUNTER — APPOINTMENT (OUTPATIENT)
Dept: GENERAL RADIOLOGY | Facility: HOSPITAL | Age: 52
End: 2022-01-18

## 2022-01-18 ENCOUNTER — TELEPHONE (OUTPATIENT)
Dept: GENERAL RADIOLOGY | Facility: HOSPITAL | Age: 52
End: 2022-01-18

## 2022-01-18 ENCOUNTER — APPOINTMENT (OUTPATIENT)
Dept: MRI IMAGING | Facility: HOSPITAL | Age: 52
End: 2022-01-18

## 2022-01-18 ENCOUNTER — APPOINTMENT (OUTPATIENT)
Dept: CT IMAGING | Facility: HOSPITAL | Age: 52
End: 2022-01-18

## 2022-01-18 ENCOUNTER — HOSPITAL ENCOUNTER (OUTPATIENT)
Facility: HOSPITAL | Age: 52
Setting detail: OBSERVATION
LOS: 1 days | Discharge: HOME | End: 2022-01-19
Attending: STUDENT IN AN ORGANIZED HEALTH CARE EDUCATION/TRAINING PROGRAM | Admitting: INTERNAL MEDICINE

## 2022-01-18 DIAGNOSIS — R42 DIZZINESS: ICD-10-CM

## 2022-01-18 DIAGNOSIS — H54.61 VISION LOSS OF RIGHT EYE: Primary | ICD-10-CM

## 2022-01-18 PROBLEM — H47.019 ISCHEMIC OPTIC NEUROPATHY: Status: ACTIVE | Noted: 2022-01-18

## 2022-01-18 LAB
ABO GROUP BLD: NORMAL
ABO GROUP BLD: NORMAL
ALBUMIN SERPL-MCNC: 4.61 G/DL (ref 3.5–5.2)
ALBUMIN/GLOB SERPL: 1.4 G/DL
ALP SERPL-CCNC: 88 U/L (ref 39–117)
ALT SERPL W P-5'-P-CCNC: 49 U/L (ref 1–41)
AMPHET+METHAMPHET UR QL: NEGATIVE
AMPHETAMINES UR QL: NEGATIVE
ANION GAP SERPL CALCULATED.3IONS-SCNC: 14.7 MMOL/L (ref 5–15)
APTT PPP: 30.5 SECONDS (ref 25.5–35.4)
AST SERPL-CCNC: 29 U/L (ref 1–40)
BARBITURATES UR QL SCN: NEGATIVE
BASOPHILS # BLD AUTO: 0.06 10*3/MM3 (ref 0–0.2)
BASOPHILS NFR BLD AUTO: 0.6 % (ref 0–1.5)
BENZODIAZ UR QL SCN: NEGATIVE
BILIRUB SERPL-MCNC: 0.3 MG/DL (ref 0–1.2)
BILIRUB UR QL STRIP: NEGATIVE
BLD GP AB SCN SERPL QL: NEGATIVE
BUN SERPL-MCNC: 12 MG/DL (ref 6–20)
BUN/CREAT SERPL: 16.2 (ref 7–25)
BUPRENORPHINE SERPL-MCNC: NEGATIVE NG/ML
CALCIUM SPEC-SCNC: 9.8 MG/DL (ref 8.6–10.5)
CANNABINOIDS SERPL QL: NEGATIVE
CHLORIDE SERPL-SCNC: 99 MMOL/L (ref 98–107)
CLARITY UR: CLEAR
CO2 SERPL-SCNC: 22.3 MMOL/L (ref 22–29)
COCAINE UR QL: NEGATIVE
COLOR UR: YELLOW
CREAT BLDA-MCNC: 0.7 MG/DL (ref 0.6–1.3)
CREAT SERPL-MCNC: 0.74 MG/DL (ref 0.76–1.27)
CRP SERPL-MCNC: 2.41 MG/DL (ref 0–0.5)
DEPRECATED RDW RBC AUTO: 42.5 FL (ref 37–54)
EOSINOPHIL # BLD AUTO: 0.13 10*3/MM3 (ref 0–0.4)
EOSINOPHIL NFR BLD AUTO: 1.2 % (ref 0.3–6.2)
ERYTHROCYTE [DISTWIDTH] IN BLOOD BY AUTOMATED COUNT: 12.9 % (ref 12.3–15.4)
ERYTHROCYTE [SEDIMENTATION RATE] IN BLOOD: 53 MM/HR (ref 0–15)
FLUAV RNA RESP QL NAA+PROBE: NOT DETECTED
FLUBV RNA RESP QL NAA+PROBE: NOT DETECTED
GFR SERPL CREATININE-BSD FRML MDRD: 112 ML/MIN/1.73
GLOBULIN UR ELPH-MCNC: 3.3 GM/DL
GLUCOSE BLDC GLUCOMTR-MCNC: 142 MG/DL (ref 70–130)
GLUCOSE SERPL-MCNC: 134 MG/DL (ref 65–99)
GLUCOSE UR STRIP-MCNC: NEGATIVE MG/DL
HBA1C MFR BLD: 5.9 % (ref 4.8–5.6)
HCT VFR BLD AUTO: 45.1 % (ref 37.5–51)
HGB BLD-MCNC: 14.9 G/DL (ref 13–17.7)
HGB UR QL STRIP.AUTO: NEGATIVE
HOLD SPECIMEN: NORMAL
HOLD SPECIMEN: NORMAL
IMM GRANULOCYTES # BLD AUTO: 0.04 10*3/MM3 (ref 0–0.05)
IMM GRANULOCYTES NFR BLD AUTO: 0.4 % (ref 0–0.5)
INR PPP: 0.88 (ref 0.9–1.1)
KETONES UR QL STRIP: NEGATIVE
LEUKOCYTE ESTERASE UR QL STRIP.AUTO: NEGATIVE
LYMPHOCYTES # BLD AUTO: 2.09 10*3/MM3 (ref 0.7–3.1)
LYMPHOCYTES NFR BLD AUTO: 20.1 % (ref 19.6–45.3)
MCH RBC QN AUTO: 29.6 PG (ref 26.6–33)
MCHC RBC AUTO-ENTMCNC: 33 G/DL (ref 31.5–35.7)
MCV RBC AUTO: 89.7 FL (ref 79–97)
METHADONE UR QL SCN: NEGATIVE
MONOCYTES # BLD AUTO: 0.51 10*3/MM3 (ref 0.1–0.9)
MONOCYTES NFR BLD AUTO: 4.9 % (ref 5–12)
NEUTROPHILS NFR BLD AUTO: 7.58 10*3/MM3 (ref 1.7–7)
NEUTROPHILS NFR BLD AUTO: 72.8 % (ref 42.7–76)
NITRITE UR QL STRIP: NEGATIVE
NRBC BLD AUTO-RTO: 0 /100 WBC (ref 0–0.2)
OPIATES UR QL: POSITIVE
OXYCODONE UR QL SCN: NEGATIVE
PCP UR QL SCN: NEGATIVE
PH UR STRIP.AUTO: 7 [PH] (ref 5–8)
PLATELET # BLD AUTO: 221 10*3/MM3 (ref 140–450)
PMV BLD AUTO: 11.8 FL (ref 6–12)
POTASSIUM SERPL-SCNC: 4 MMOL/L (ref 3.5–5.2)
PROPOXYPH UR QL: NEGATIVE
PROT SERPL-MCNC: 7.9 G/DL (ref 6–8.5)
PROT UR QL STRIP: NEGATIVE
PROTHROMBIN TIME: 12.4 SECONDS (ref 12.8–14.5)
RBC # BLD AUTO: 5.03 10*6/MM3 (ref 4.14–5.8)
RH BLD: NEGATIVE
RH BLD: NEGATIVE
SARS-COV-2 RNA RESP QL NAA+PROBE: NOT DETECTED
SODIUM SERPL-SCNC: 136 MMOL/L (ref 136–145)
SP GR UR STRIP: 1.03 (ref 1–1.03)
T&S EXPIRATION DATE: NORMAL
TRICYCLICS UR QL SCN: NEGATIVE
TROPONIN T SERPL-MCNC: <0.01 NG/ML (ref 0–0.03)
UROBILINOGEN UR QL STRIP: NORMAL
WBC NRBC COR # BLD: 10.41 10*3/MM3 (ref 3.4–10.8)
WHOLE BLOOD HOLD SPECIMEN: NORMAL
WHOLE BLOOD HOLD SPECIMEN: NORMAL

## 2022-01-18 PROCEDURE — 70553 MRI BRAIN STEM W/O & W/DYE: CPT | Performed by: RADIOLOGY

## 2022-01-18 PROCEDURE — 70496 CT ANGIOGRAPHY HEAD: CPT

## 2022-01-18 PROCEDURE — 93005 ELECTROCARDIOGRAM TRACING: CPT | Performed by: STUDENT IN AN ORGANIZED HEALTH CARE EDUCATION/TRAINING PROGRAM

## 2022-01-18 PROCEDURE — 0 GADOBENATE DIMEGLUMINE 529 MG/ML SOLUTION: Performed by: STUDENT IN AN ORGANIZED HEALTH CARE EDUCATION/TRAINING PROGRAM

## 2022-01-18 PROCEDURE — 86901 BLOOD TYPING SEROLOGIC RH(D): CPT | Performed by: STUDENT IN AN ORGANIZED HEALTH CARE EDUCATION/TRAINING PROGRAM

## 2022-01-18 PROCEDURE — A9577 INJ MULTIHANCE: HCPCS | Performed by: STUDENT IN AN ORGANIZED HEALTH CARE EDUCATION/TRAINING PROGRAM

## 2022-01-18 PROCEDURE — 81003 URINALYSIS AUTO W/O SCOPE: CPT | Performed by: STUDENT IN AN ORGANIZED HEALTH CARE EDUCATION/TRAINING PROGRAM

## 2022-01-18 PROCEDURE — 85730 THROMBOPLASTIN TIME PARTIAL: CPT | Performed by: STUDENT IN AN ORGANIZED HEALTH CARE EDUCATION/TRAINING PROGRAM

## 2022-01-18 PROCEDURE — 70553 MRI BRAIN STEM W/O & W/DYE: CPT

## 2022-01-18 PROCEDURE — 86850 RBC ANTIBODY SCREEN: CPT | Performed by: STUDENT IN AN ORGANIZED HEALTH CARE EDUCATION/TRAINING PROGRAM

## 2022-01-18 PROCEDURE — 0042T CT CEREBRAL PERFUSION W WO CONTRAST: CPT | Performed by: RADIOLOGY

## 2022-01-18 PROCEDURE — 99220 PR INITIAL OBSERVATION CARE/DAY 70 MINUTES: CPT | Performed by: PHYSICIAN ASSISTANT

## 2022-01-18 PROCEDURE — 86900 BLOOD TYPING SEROLOGIC ABO: CPT

## 2022-01-18 PROCEDURE — 87636 SARSCOV2 & INF A&B AMP PRB: CPT | Performed by: STUDENT IN AN ORGANIZED HEALTH CARE EDUCATION/TRAINING PROGRAM

## 2022-01-18 PROCEDURE — 83036 HEMOGLOBIN GLYCOSYLATED A1C: CPT | Performed by: HOSPITALIST

## 2022-01-18 PROCEDURE — 71045 X-RAY EXAM CHEST 1 VIEW: CPT | Performed by: RADIOLOGY

## 2022-01-18 PROCEDURE — 25010000002 PROCHLORPERAZINE 10 MG/2ML SOLUTION: Performed by: STUDENT IN AN ORGANIZED HEALTH CARE EDUCATION/TRAINING PROGRAM

## 2022-01-18 PROCEDURE — C9803 HOPD COVID-19 SPEC COLLECT: HCPCS

## 2022-01-18 PROCEDURE — 96361 HYDRATE IV INFUSION ADD-ON: CPT

## 2022-01-18 PROCEDURE — 71045 X-RAY EXAM CHEST 1 VIEW: CPT

## 2022-01-18 PROCEDURE — 85652 RBC SED RATE AUTOMATED: CPT | Performed by: STUDENT IN AN ORGANIZED HEALTH CARE EDUCATION/TRAINING PROGRAM

## 2022-01-18 PROCEDURE — 25010000002 DIPHENHYDRAMINE PER 50 MG: Performed by: STUDENT IN AN ORGANIZED HEALTH CARE EDUCATION/TRAINING PROGRAM

## 2022-01-18 PROCEDURE — 0042T HC CT CEREBRAL PERFUSION W/WO CONTRAST: CPT

## 2022-01-18 PROCEDURE — 70496 CT ANGIOGRAPHY HEAD: CPT | Performed by: RADIOLOGY

## 2022-01-18 PROCEDURE — 70498 CT ANGIOGRAPHY NECK: CPT

## 2022-01-18 PROCEDURE — 86900 BLOOD TYPING SEROLOGIC ABO: CPT | Performed by: STUDENT IN AN ORGANIZED HEALTH CARE EDUCATION/TRAINING PROGRAM

## 2022-01-18 PROCEDURE — 86140 C-REACTIVE PROTEIN: CPT | Performed by: STUDENT IN AN ORGANIZED HEALTH CARE EDUCATION/TRAINING PROGRAM

## 2022-01-18 PROCEDURE — 70450 CT HEAD/BRAIN W/O DYE: CPT

## 2022-01-18 PROCEDURE — 96374 THER/PROPH/DIAG INJ IV PUSH: CPT

## 2022-01-18 PROCEDURE — 82565 ASSAY OF CREATININE: CPT

## 2022-01-18 PROCEDURE — G0378 HOSPITAL OBSERVATION PER HR: HCPCS

## 2022-01-18 PROCEDURE — 99285 EMERGENCY DEPT VISIT HI MDM: CPT

## 2022-01-18 PROCEDURE — 82962 GLUCOSE BLOOD TEST: CPT

## 2022-01-18 PROCEDURE — 0 IOPAMIDOL PER 1 ML: Performed by: STUDENT IN AN ORGANIZED HEALTH CARE EDUCATION/TRAINING PROGRAM

## 2022-01-18 PROCEDURE — 96375 TX/PRO/DX INJ NEW DRUG ADDON: CPT

## 2022-01-18 PROCEDURE — 25010000002 DEXAMETHASONE PER 1 MG: Performed by: STUDENT IN AN ORGANIZED HEALTH CARE EDUCATION/TRAINING PROGRAM

## 2022-01-18 PROCEDURE — 70450 CT HEAD/BRAIN W/O DYE: CPT | Performed by: RADIOLOGY

## 2022-01-18 PROCEDURE — 70498 CT ANGIOGRAPHY NECK: CPT | Performed by: RADIOLOGY

## 2022-01-18 PROCEDURE — 80306 DRUG TEST PRSMV INSTRMNT: CPT | Performed by: NURSE PRACTITIONER

## 2022-01-18 PROCEDURE — 99204 OFFICE O/P NEW MOD 45 MIN: CPT | Performed by: PSYCHIATRY & NEUROLOGY

## 2022-01-18 PROCEDURE — 80050 GENERAL HEALTH PANEL: CPT | Performed by: STUDENT IN AN ORGANIZED HEALTH CARE EDUCATION/TRAINING PROGRAM

## 2022-01-18 PROCEDURE — 86901 BLOOD TYPING SEROLOGIC RH(D): CPT

## 2022-01-18 PROCEDURE — 84484 ASSAY OF TROPONIN QUANT: CPT | Performed by: STUDENT IN AN ORGANIZED HEALTH CARE EDUCATION/TRAINING PROGRAM

## 2022-01-18 PROCEDURE — 85610 PROTHROMBIN TIME: CPT | Performed by: STUDENT IN AN ORGANIZED HEALTH CARE EDUCATION/TRAINING PROGRAM

## 2022-01-18 RX ORDER — ATORVASTATIN CALCIUM 40 MG/1
40 TABLET, FILM COATED ORAL NIGHTLY
Status: DISCONTINUED | OUTPATIENT
Start: 2022-01-18 | End: 2022-01-19 | Stop reason: HOSPADM

## 2022-01-18 RX ORDER — CLOPIDOGREL BISULFATE 75 MG/1
300 TABLET ORAL ONCE
Status: COMPLETED | OUTPATIENT
Start: 2022-01-18 | End: 2022-01-18

## 2022-01-18 RX ORDER — ASPIRIN 81 MG/1
324 TABLET, CHEWABLE ORAL ONCE
Status: COMPLETED | OUTPATIENT
Start: 2022-01-18 | End: 2022-01-18

## 2022-01-18 RX ORDER — DEXAMETHASONE SODIUM PHOSPHATE 10 MG/ML
10 INJECTION INTRAMUSCULAR; INTRAVENOUS ONCE
Status: COMPLETED | OUTPATIENT
Start: 2022-01-18 | End: 2022-01-18

## 2022-01-18 RX ORDER — ASPIRIN 81 MG/1
81 TABLET ORAL DAILY
Status: DISCONTINUED | OUTPATIENT
Start: 2022-01-19 | End: 2022-01-19

## 2022-01-18 RX ORDER — HYDROCODONE BITARTRATE AND ACETAMINOPHEN 10; 325 MG/1; MG/1
1 TABLET ORAL EVERY 6 HOURS PRN
COMMUNITY

## 2022-01-18 RX ORDER — DIPHENHYDRAMINE HYDROCHLORIDE 50 MG/ML
25 INJECTION INTRAMUSCULAR; INTRAVENOUS ONCE
Status: COMPLETED | OUTPATIENT
Start: 2022-01-18 | End: 2022-01-18

## 2022-01-18 RX ORDER — MECLIZINE HCL 12.5 MG/1
25 TABLET ORAL ONCE
Status: COMPLETED | OUTPATIENT
Start: 2022-01-18 | End: 2022-01-18

## 2022-01-18 RX ORDER — CLOPIDOGREL BISULFATE 75 MG/1
75 TABLET ORAL DAILY
Status: DISCONTINUED | OUTPATIENT
Start: 2022-01-19 | End: 2022-01-19 | Stop reason: HOSPADM

## 2022-01-18 RX ORDER — HYDRALAZINE HYDROCHLORIDE 25 MG/1
25 TABLET, FILM COATED ORAL EVERY 8 HOURS PRN
Status: DISCONTINUED | OUTPATIENT
Start: 2022-01-18 | End: 2022-01-19 | Stop reason: HOSPADM

## 2022-01-18 RX ORDER — SODIUM CHLORIDE 0.9 % (FLUSH) 0.9 %
10 SYRINGE (ML) INJECTION AS NEEDED
Status: DISCONTINUED | OUTPATIENT
Start: 2022-01-18 | End: 2022-01-19 | Stop reason: HOSPADM

## 2022-01-18 RX ORDER — GABAPENTIN 600 MG/1
600 TABLET ORAL 3 TIMES DAILY
COMMUNITY

## 2022-01-18 RX ORDER — CLONAZEPAM 0.5 MG/1
0.5 TABLET ORAL 2 TIMES DAILY
COMMUNITY
End: 2022-01-28 | Stop reason: ALTCHOICE

## 2022-01-18 RX ORDER — OMEPRAZOLE 20 MG/1
20 CAPSULE, DELAYED RELEASE ORAL DAILY
COMMUNITY
End: 2022-01-19

## 2022-01-18 RX ORDER — PROCHLORPERAZINE EDISYLATE 5 MG/ML
5 INJECTION INTRAMUSCULAR; INTRAVENOUS ONCE
Status: COMPLETED | OUTPATIENT
Start: 2022-01-18 | End: 2022-01-18

## 2022-01-18 RX ADMIN — SODIUM CHLORIDE 1000 ML: 9 INJECTION, SOLUTION INTRAVENOUS at 14:30

## 2022-01-18 RX ADMIN — MECLIZINE HCL 12.5 MG 25 MG: 12.5 TABLET ORAL at 16:14

## 2022-01-18 RX ADMIN — ATORVASTATIN CALCIUM 40 MG: 40 TABLET, FILM COATED ORAL at 23:30

## 2022-01-18 RX ADMIN — MECLIZINE HCL 12.5 MG 25 MG: 12.5 TABLET ORAL at 14:30

## 2022-01-18 RX ADMIN — PROCHLORPERAZINE EDISYLATE 5 MG: 5 INJECTION INTRAMUSCULAR; INTRAVENOUS at 17:40

## 2022-01-18 RX ADMIN — DIPHENHYDRAMINE HYDROCHLORIDE 25 MG: 50 INJECTION INTRAMUSCULAR; INTRAVENOUS at 17:39

## 2022-01-18 RX ADMIN — GADOBENATE DIMEGLUMINE 18 ML: 529 INJECTION, SOLUTION INTRAVENOUS at 16:14

## 2022-01-18 RX ADMIN — DEXAMETHASONE SODIUM PHOSPHATE 10 MG: 10 INJECTION INTRAMUSCULAR; INTRAVENOUS at 17:40

## 2022-01-18 RX ADMIN — CLOPIDOGREL 300 MG: 75 TABLET, FILM COATED ORAL at 23:24

## 2022-01-18 RX ADMIN — ASPIRIN 324 MG: 81 TABLET, CHEWABLE ORAL at 17:09

## 2022-01-18 RX ADMIN — IOPAMIDOL 120 ML: 755 INJECTION, SOLUTION INTRAVENOUS at 12:11

## 2022-01-18 NOTE — CONSULTS
Stroke Consult Note    Patient Name: Girish Loja   MRN: [id number]  Age: 51 y.o.  Sex: male  : 1970    Primary Care Physician: Madhuri White APRN  Referring Physician:  No ref. provider found    TIME STROKE TEAM CALLED: 1130 EST     TIME PATIENT SEEN: 1330 EST      Chief Complaint/Reason for Consultation: dizziness, headache, blurred vision    Subjective .  HPI: 50 y/o male p/w new c/o dizziness, headache, right sided blurred vision, LKN the night before. In ED anxious, NIH 3, nonfocal, /116, NSR, T97.7, CTH/CTP/CTA negative, personally reviewed, COVID negative. Denies head trauma, fever.    Last Known Normal Date/Time: last night    Review of Systems   Eyes: Positive for visual disturbance.   Neurological: Positive for dizziness and headaches.   All other systems reviewed and are negative.       Temp:  [97.6 °F (36.4 °C)-97.7 °F (36.5 °C)] 97.6 °F (36.4 °C)  Heart Rate:  [71-97] 88  Resp:  [16-18] 16  BP: (155-168)/(104-121) 164/121        Objective   Neurological Exam  Mental Status  Alert.    Cranial Nerves  CN III, IV, VI: Extraocular movements intact bilaterally.    Reflexes  Deep tendon reflexes are 2+ and symmetric in all four extremities with downgoing toes bilaterally.      Physical Exam  Constitutional:       Appearance: He is well-developed.   HENT:      Head: Normocephalic and atraumatic.   Eyes:      Extraocular Movements: Extraocular movements intact.   Cardiovascular:      Rate and Rhythm: Normal rate and regular rhythm.   Pulmonary:      Effort: Pulmonary effort is normal.   Neurological:      Mental Status: He is alert and oriented to person, place, and time.      Deep Tendon Reflexes: Reflexes are normal and symmetric.      Comments: Speech clear, anxious, counts fingers better on the left side, no facial droop, no limb drift.   Psychiatric:         Behavior: Behavior normal.         Thought Content: Thought content normal.         Acute Stroke Data    Alteplase (tPA) Inclusion  / Exclusion Criteria    Time: 15:56 EST  Person Administering Scale: Matt Ulloa MD    Inclusion Criteria  []   18 years of age or greater   []   Onset of symptoms < 4.5 hours before beginning treatment (stroke onset = time patient was last seen well or without symptoms).   []   Diagnosis of acute ischemic stroke causing measurable disabling deficit (Complete Hemianopia, Any Aphasia, Visual or Sensory Extinction, Any weakness limiting sustained effort against gravity)   []   Any remaining deficit considered potentially disabling in view of patient and practitioner   Exclusion criteria (Do not proceed with Alteplase if any are checked under exclusion criteria)  [x]   Onset unknown or GREATER than 4.5 hours   []   ICH on CT/MRI   []   CT demonstrates hypodensity representing acute or subacute infarct   []   Significant head trauma or prior stroke in the previous 3 months   []   Symptoms suggestive of subarachnoid hemorrhage   []   History of un-ruptured intracranial aneurysm GREATER than 10 mm   []   Recent intracranial or intraspinal surgery within the last 3 months   []   Arterial puncture at a non-compressible site in the previous 7 days   []   Active internal bleeding   []   Acute bleeding tendency   []   Platelet count LESS than 100,000 for known hematological diseases such as leukemia, thrombocytopenia or chronic cirrhosis   []   Current use of anticoagulant with INR GREATER than 1.7 or PT GREATER than 15 seconds, aPTT GREATER than 40 seconds   []   Heparin received within 48 hours, resulting in abnormally elevated aPTT GREATER than upper limit of normal   []   Current use of direct thrombin inhibitors or direct factor Xa inhibitors in the past 48 hours   []   Elevated blood pressure refractory to treatment (systolic GREATER than 185 mm/Hg or diastolic  GREATER than 110 mm/Hg   []   Suspected infective endocarditis and aortic arch dissection   []   Current use of therapeutic treatment dose of  low-molecular-weight heparin (LMWH) within the previous 24 hours   []   Structural GI malignancy or bleed   Relative exclusion for all patients  []   Only minor non-disabling symptoms   []   Pregnancy   []   Seizure at onset with postictal residual neurological impairments   []   Major surgery or previous trauma within past 14 days   []   History of previous spontaneous ICH, intracranial neoplasm, or AV malformation   []   Postpartum (within previous 14 days)   []   Recent GI or urinary tract hemorrhage (within previous 21 days)   []   Recent acute MI (within previous 3 months)   []   History of un-ruptured intracranial aneurysm LESS than 10 mm   []   History of ruptured intracranial aneurysm   []   Blood glucose LESS than 50 mg/dL (2.7 mmol/L)   []   Dural puncture within the last 7 days   []   Known GREATER than 10 cerebral microbleeds   Additional exclusions for patients with symptoms onset between 3 and 4.5 hours.  []   Age > 80.   []   On any anticoagulants regardless of INR  >>> Warfarin (Coumadin), Heparin, Enoxaparin (Lovenox), fondaparinux (Arixtra), bivalirudin (Angiomax), Argatroban, dabigatran (Pradaxa), rivaroxaban (Xarelto), or apixaban (Eliquis)   []   Severe stroke (NIHSS > 25).   []   History of BOTH diabetes and previous ischemic stroke.   []   The risks and benefits have been discussed with the patient or family related to the administration of IV Alteplase for stroke symptoms.   []   I have discussed and reviewed the patient's case and imaging with the attending prior to IV Alteplase.   N/A Time Alteplase administered       Past Medical History:   Diagnosis Date   • Hypertension    • Seizures (CMS/HCC)      Past Surgical History:   Procedure Laterality Date   • ABSCESS DRAINAGE      of chest wall   • ERCP WITH STENT PLACEMENT      car wreck metal all places right side of body     No family history on file.  Social History     Socioeconomic History   • Marital status:    Tobacco Use   •  Smoking status: Current Every Day Smoker     Packs/day: 1.00     Years: 35.00     Pack years: 35.00     Types: Cigarettes   • Smokeless tobacco: Never Used   Substance and Sexual Activity   • Alcohol use: No   • Drug use: No     Allergies   Allergen Reactions   • Penicillins Anaphylaxis     Prior to Admission medications    Medication Sig Start Date End Date Taking? Authorizing Provider   albuterol sulfate  (90 Base) MCG/ACT inhaler Inhale 2 puffs Every 4 (Four) Hours As Needed for Wheezing. 11/2/19   Elder Sampson PA   benzonatate (TESSALON) 100 MG capsule Take 1 capsule by mouth 3 (Three) Times a Day As Needed for Cough. 11/2/19   Elder Sampson PA   brimonidine-timolol (COMBIGAN) 0.2-0.5 % ophthalmic solution Every 12 (Twelve) Hours.    Rocio Roy MD   ibuprofen (ADVIL,MOTRIN) 600 MG tablet Take 1 tablet by mouth Every 6 (Six) Hours As Needed for Mild Pain . 11/12/20   Leni West PA   ipratropium-albuterol (COMBIVENT RESPIMAT)  MCG/ACT inhaler Inhale 1 puff 4 (Four) Times a Day As Needed for Wheezing.    Rocio Roy MD   meloxicam (MOBIC) 15 MG tablet Take 15 mg by mouth Daily.    Rocio Roy MD   methylPREDNISolone (MEDROL, PEGGY,) 4 MG tablet Take as directed on package instructions. 11/2/19   Elder Sampson PA   metoprolol tartrate (LOPRESSOR) 25 MG tablet Take 25 mg by mouth 2 (Two) Times a Day.    Rocio Roy MD   montelukast (SINGULAIR) 10 MG tablet Take 10 mg by mouth Every Night.    Rocio Roy MD   omeprazole (priLOSEC) 40 MG capsule Take 40 mg by mouth Daily.    Rocio Roy MD   raNITIdine (ZANTAC) 150 MG tablet Take 150 mg by mouth 2 (Two) Times a Day.    Rocio Roy MD       Hospital Meds:  Scheduled- gadobenate dimeglumine, 18 mL, Intravenous, Once in imaging  meclizine, 25 mg, Oral, Once      Infusions-     PRNs- sodium chloride    Functional Status Prior to Current Stroke/Dougherty Score: 0    NIH Stroke  Scale  Time: 15:56 EST  Person Administering Scale: Matt Ulloa MD    1a  Level of consciousness:0    1b. LOC questions: 1    1c. LOC commands:0    2.  Best Gaze:0    3.  Visual:1    4. Facial Palsy:0    5a.  Motor left arm:0    5b.  Motor right arm:0    6a. motor left le    6b  Motor right le    7. Limb Ataxia:0    8.  Sensory:1    9. Best Language: 0    10. Dysarthria:0    11. Extinction and Inattention:0     Total:3          Results Reviewed:  I have personally reviewed current lab, radiology, and data and agree with results.     XR Chest 1 View    Result Date: 2022  EXAM:   XR Chest, 1 View  EXAM DATE:   2022 12:01 PM  CLINICAL HISTORY:   Acute Stroke Protocol (Onset < 12 hrs)  TECHNIQUE:   Frontal view of the chest.  COMPARISON:   No relevant prior studies available.  FINDINGS:   Lungs:  Calcified granuloma right lung. Lungs otherwise clear.   Pleural space:  Unremarkable.  No pneumothorax.   Heart:  Unremarkable.  No cardiomegaly.   Mediastinum:  Unremarkable.   Bones/joints:  Unremarkable.        No acute cardiopulmonary findings.  This report was finalized on 2022 12:19 PM by Dr. Faustino Alvarez MD.      CT Angiogram Carotids    Result Date: 2022  EXAM:   CT Angiography Neck With Intravenous Contrast  EXAM DATE:   2022 11:53 AM  CLINICAL HISTORY:   Stroke, follow up  TECHNIQUE:   Axial computed tomographic angiography images of the neck with intravenous contrast. LVO analysis was performed with [email or web address] software. This CT exam was performed using one or more of the following dose reduction techniques:  automated exposure control, adjustment of the mA and/or kV according to patient size, and/or use of iterative reconstruction technique.   MIP reconstructed images were created and reviewed.  COMPARISON:   No relevant prior studies available.  FINDINGS:   VASCULATURE:   Right common carotid artery:  Unremarkable.  No significant stenosis. No dissection or occlusion.    Right internal carotid artery:  Very mild plaque right ICA proximal aspect without evidence of significant stenosis or occlusion.   Right external carotid artery:  Unremarkable.  No occlusion.   Right vertebral artery:  Unremarkable.  No significant stenosis.  No dissection or occlusion.    Left common carotid artery:  Unremarkable.  No significant stenosis. No dissection or occlusion.   Left internal carotid artery:  Mild-moderate plaque proximal left ICA. No occlusion or significant stenosis.   Left external carotid artery:  Unremarkable.  No occlusion.   Left vertebral artery:  Unremarkable.  No significant stenosis.  No dissection or occlusion.   Other vasculature:  Three-vessel arch configuration is noted.   NECK:   Bones/joints:  Degenerative changes cervical spine with multilevel central canal and neural foraminal stenosis most pronounced C5/6 and C6/7.  No acute fracture.  No dislocation.   Soft tissues:  No enhancing soft tissue masses or fluid collections.   Lymph nodes:  No adenopathy by CT size criteria.   Lung apices:  Paraseptal and centrilobular emphysema of the upper lungs.   CAROTID STENOSIS REFERENCE USING NASCET CRITERIA:   % ICA stenosis = (1 - narrowest ICA diameter/diameter of distal cervical ICA) x 100.   Mild - <50% stenosis.   Moderate - 50-69% stenosis.   Severe - 70-94% stenosis.   Near occlusion - 95-99% stenosis.   Occluded - 100% stenosis.      1.  Mild-moderate plaque left greater than right carotid systems. No hemodynamically significant stenosis or occlusion. 2.  Other nonacute and incidental findings as detailed above.  This report was finalized on 1/18/2022 12:21 PM by Dr. Faustino Alvarez MD.      CT Angiogram Head    Result Date: 1/18/2022  EXAM:   CT Angiography Head With Intravenous Contrast  EXAM DATE:   1/18/2022 11:52 AM  CLINICAL HISTORY:   Stroke, follow up  TECHNIQUE:   Axial computed tomographic angiography images of the head with intravenous contrast. LVO analysis was  performed with [employer] software. This CT exam was performed using one or more of the following dose reduction techniques:  automated exposure control, adjustment of the mA and/or kV according to patient size, and/or use of iterative reconstruction technique.   MIP reconstructed images were created and reviewed.  COMPARISON:   CT same day  FINDINGS:   Right internal carotid artery:  There is mild calcified plaque of the intracranial right ICA segments. No significant stenosis or occlusion. No aneurysm.   Right anterior cerebral artery:  Unremarkable.  No occlusion or significant stenosis.  No aneurysm.   Right middle cerebral artery:  Unremarkable.  No occlusion or significant stenosis.  No aneurysm.   Right posterior cerebral artery:  Unremarkable.  No occlusion or significant stenosis.  No aneurysm.   Right vertebral artery:  Unremarkable as visualized.    Left internal carotid artery:  There is mild calcified plaque of the intracranial left ICA segments. No significant stenosis or occlusion. No aneurysm.   Left anterior cerebral artery:  Unremarkable.  No occlusion or significant stenosis.  No aneurysm.   Left middle cerebral artery:  Unremarkable.  No occlusion or significant stenosis.  No aneurysm.   Left posterior cerebral artery:  Unremarkable.  No occlusion or significant stenosis.  No aneurysm.   Left vertebral artery:  Unremarkable as visualized.    Basilar artery:  Unremarkable.  No occlusion or significant stenosis. No aneurysm.         Unremarkable CTA of the brain demonstrating no significant stenosis or large vessel occlusion of the major intracranial arterial vasculature.  This report was finalized on 1/18/2022 12:18 PM by Dr. Faustino Alvarez MD.      CT Head Without Contrast Stroke Protocol    Result Date: 1/18/2022  EXAM:   CT Head Without Intravenous Contrast  EXAM DATE:   1/18/2022 11:36 AM  CLINICAL HISTORY:   Neuro deficit, acute, stroke suspected  TECHNIQUE:   Axial computed tomography images of  the head/brain without intravenous contrast.  Sagittal and coronal reformatted images were created and reviewed.  This CT exam was performed using one or more of the following dose reduction techniques:  automated exposure control, adjustment of the mA and/or kV according to patient size, and/or use of iterative reconstruction technique.  COMPARISON:   No relevant prior studies available.  FINDINGS:   Brain:  No convincing evidence of extra-axial hematoma.  No brain parenchymal hemorrhages.  No significant white matter disease.   Midline shift:  No midline shift.   Ventricles:  No hydrocephalus.   Bones/joints:  Unremarkable.  No acute fracture.   Soft tissues:  Unremarkable.   Sinuses:  Unremarkable as visualized.  No acute sinusitis.   Mastoid air cells:  Unremarkable as visualized.  No mastoid effusion.   Other findings:  Falcine calcifications are noted.        No CT evidence of acute intracranial abnormality.  Findings communicated to Dr. Perea,  physician, at 11:47 AM on 01/18/2022.  This report was finalized on 1/18/2022 11:47 AM by Dr. Faustino Alvarez MD.      CT CEREBRAL PERFUSION WITH & WITHOUT CONTRAST    Result Date: 1/18/2022  EXAM:   CT Brain Perfusion Without and With Intravenous Contrast, [email or web address] Protocol  EXAM DATE:   1/18/2022 11:53 AM  CLINICAL HISTORY:   Neuro deficit, acute, stroke suspected  TECHNIQUE:   Axial computed tomography images of the brain without and with intravenous contrast using cerebral perfusion protocol.  Post-processing parametric maps were created using [employer] software and reviewed. Sagittal and coronal reformatted images were created and reviewed.  This CT exam was performed using one or more of the following dose reduction techniques:  automated exposure control, adjustment of the mA and/or kV according to patient size, and/or use of iterative reconstruction technique.  COMPARISON:   CT, CTA same day  FINDINGS:   Arterial input function:  Optimal.   Estimated infarct core,  CBF < 30%:  0   Perfusion, Tmax > 6s:  0   Mismatch volume:  0   Mismatch ratio:  N/A   Perfusion, Tmax > 10s:  0    Brain:  Unremarkable.        No acute brain perfusion abnormality.  This report was finalized on 1/18/2022 12:19 PM by Dr. Faustino Alvarez MD.              Assessment/Plan:      Stroke-like symptoms and headache, favor migraine in the setting of HTN, anxiety. Unremarkable CTH, CTP, CTA encouraging, personally reviewed. No TPA on time.   -  mg now.   - Stat MRI brain.   - UDS.   - CRP.   - BP<130/80.   - Consider a migraine cocktail.   - If MRI negative and symptoms improve, okay to F/U with PCP and outpatient neurology clinic.    Call for question, will see prn. Thanks,          Matt Ulloa MD  January 18, 2022  15:56 EST    Verbal consent taken.  Patient agreeable to be seen via telemedicine.    This was an audio and video enabled telemedicine encounter.

## 2022-01-18 NOTE — ED NOTES
Code stroke called in triage at this time r/t to Pt complaints of dizziness, difficulty, walking, and a generalized headache. [facility] team arrived, Pt transported to CT with RN at side. Provider to CT for evaluation.      Tanya Ward, RN  01/18/22 [id number]
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
## 2022-01-18 NOTE — ED NOTES
Code stroke at this time, stroke team transported to ct. Er provider present     Terese Mojica RN  01/18/22 [id number]

## 2022-01-19 ENCOUNTER — ANESTHESIA EVENT (OUTPATIENT)
Dept: CARDIOLOGY | Facility: HOSPITAL | Age: 52
End: 2022-01-19

## 2022-01-19 ENCOUNTER — APPOINTMENT (OUTPATIENT)
Dept: CARDIOLOGY | Facility: HOSPITAL | Age: 52
End: 2022-01-19

## 2022-01-19 ENCOUNTER — ANESTHESIA (OUTPATIENT)
Dept: CARDIOLOGY | Facility: HOSPITAL | Age: 52
End: 2022-01-19

## 2022-01-19 VITALS
DIASTOLIC BLOOD PRESSURE: 100 MMHG | HEIGHT: 66 IN | HEART RATE: 90 BPM | TEMPERATURE: 97.7 F | BODY MASS INDEX: 31.63 KG/M2 | RESPIRATION RATE: 18 BRPM | OXYGEN SATURATION: 98 % | WEIGHT: 196.8 LBS | SYSTOLIC BLOOD PRESSURE: 151 MMHG

## 2022-01-19 PROBLEM — H54.61 VISION LOSS OF RIGHT EYE: Status: ACTIVE | Noted: 2022-01-19

## 2022-01-19 LAB
ALBUMIN SERPL-MCNC: 4.47 G/DL (ref 3.5–5.2)
ALBUMIN/GLOB SERPL: 1.3 G/DL
ALP SERPL-CCNC: 85 U/L (ref 39–117)
ALT SERPL W P-5'-P-CCNC: 50 U/L (ref 1–41)
ANION GAP SERPL CALCULATED.3IONS-SCNC: 13.7 MMOL/L (ref 5–15)
AST SERPL-CCNC: 30 U/L (ref 1–40)
BASOPHILS # BLD AUTO: 0.02 10*3/MM3 (ref 0–0.2)
BASOPHILS NFR BLD AUTO: 0.2 % (ref 0–1.5)
BH CV ECHO MEAS - % IVS THICK: 12.1 %
BH CV ECHO MEAS - % LVPW THICK: 4.1 %
BH CV ECHO MEAS - ACS: 2.5 CM
BH CV ECHO MEAS - AO MAX PG: 10.5 MMHG
BH CV ECHO MEAS - AO MEAN PG: 5 MMHG
BH CV ECHO MEAS - AO ROOT AREA (BSA CORRECTED): 1.9
BH CV ECHO MEAS - AO ROOT AREA: 10.8 CM^2
BH CV ECHO MEAS - AO ROOT DIAM: 3.7 CM
BH CV ECHO MEAS - AO V2 MAX: 162 CM/SEC
BH CV ECHO MEAS - AO V2 MEAN: 106 CM/SEC
BH CV ECHO MEAS - AO V2 VTI: 27.9 CM
BH CV ECHO MEAS - BSA(HAYCOCK): 2.1 M^2
BH CV ECHO MEAS - BSA(HAYCOCK): 2.1 M^2
BH CV ECHO MEAS - BSA: 2 M^2
BH CV ECHO MEAS - BSA: 2 M^2
BH CV ECHO MEAS - BZI_BMI: 31.6 KILOGRAMS/M^2
BH CV ECHO MEAS - BZI_BMI: 31.6 KILOGRAMS/M^2
BH CV ECHO MEAS - BZI_METRIC_HEIGHT: 167.6 CM
BH CV ECHO MEAS - BZI_METRIC_HEIGHT: 167.6 CM
BH CV ECHO MEAS - BZI_METRIC_WEIGHT: 88.9 KG
BH CV ECHO MEAS - BZI_METRIC_WEIGHT: 88.9 KG
BH CV ECHO MEAS - EDV(CUBED): 105.5 ML
BH CV ECHO MEAS - EDV(MOD-SP4): 61.4 ML
BH CV ECHO MEAS - EDV(TEICH): 103.6 ML
BH CV ECHO MEAS - EF(CUBED): 77.8 %
BH CV ECHO MEAS - EF(MOD-SP4): 62.2 %
BH CV ECHO MEAS - EF(TEICH): 70 %
BH CV ECHO MEAS - ESV(CUBED): 23.4 ML
BH CV ECHO MEAS - ESV(MOD-SP4): 23.2 ML
BH CV ECHO MEAS - ESV(TEICH): 31.1 ML
BH CV ECHO MEAS - FS: 39.5 %
BH CV ECHO MEAS - IVS/LVPW: 0.96
BH CV ECHO MEAS - IVSD: 1.1 CM
BH CV ECHO MEAS - IVSS: 1.2 CM
BH CV ECHO MEAS - LA DIMENSION: 2.9 CM
BH CV ECHO MEAS - LA/AO: 0.77
BH CV ECHO MEAS - LV DIASTOLIC VOL/BSA (35-75): 31 ML/M^2
BH CV ECHO MEAS - LV MASS(C)D: 183 GRAMS
BH CV ECHO MEAS - LV MASS(C)DI: 92.3 GRAMS/M^2
BH CV ECHO MEAS - LV MASS(C)S: 96.8 GRAMS
BH CV ECHO MEAS - LV MASS(C)SI: 48.8 GRAMS/M^2
BH CV ECHO MEAS - LV SYSTOLIC VOL/BSA (12-30): 11.7 ML/M^2
BH CV ECHO MEAS - LVIDD: 4.7 CM
BH CV ECHO MEAS - LVIDS: 2.9 CM
BH CV ECHO MEAS - LVLD AP4: 6.3 CM
BH CV ECHO MEAS - LVLS AP4: 5.5 CM
BH CV ECHO MEAS - LVOT AREA (M): 3.8 CM^2
BH CV ECHO MEAS - LVOT AREA: 3.8 CM^2
BH CV ECHO MEAS - LVOT DIAM: 2.2 CM
BH CV ECHO MEAS - LVPWD: 1.1 CM
BH CV ECHO MEAS - LVPWS: 1.1 CM
BH CV ECHO MEAS - MV A MAX VEL: 87 CM/SEC
BH CV ECHO MEAS - MV E MAX VEL: 60 CM/SEC
BH CV ECHO MEAS - MV E/A: 0.69
BH CV ECHO MEAS - PA ACC TIME: 0.12 SEC
BH CV ECHO MEAS - PA PR(ACCEL): 23.7 MMHG
BH CV ECHO MEAS - SI(AO): 151.3 ML/M^2
BH CV ECHO MEAS - SI(CUBED): 41.4 ML/M^2
BH CV ECHO MEAS - SI(MOD-SP4): 19.3 ML/M^2
BH CV ECHO MEAS - SI(TEICH): 36.6 ML/M^2
BH CV ECHO MEAS - SV(AO): 300 ML
BH CV ECHO MEAS - SV(CUBED): 82.1 ML
BH CV ECHO MEAS - SV(MOD-SP4): 38.2 ML
BH CV ECHO MEAS - SV(TEICH): 72.5 ML
BILIRUB SERPL-MCNC: 0.2 MG/DL (ref 0–1.2)
BUN SERPL-MCNC: 11 MG/DL (ref 6–20)
BUN/CREAT SERPL: 12.5 (ref 7–25)
CALCIUM SPEC-SCNC: 9.9 MG/DL (ref 8.6–10.5)
CHLORIDE SERPL-SCNC: 103 MMOL/L (ref 98–107)
CHOLEST SERPL-MCNC: 253 MG/DL (ref 0–200)
CO2 SERPL-SCNC: 19.3 MMOL/L (ref 22–29)
CREAT SERPL-MCNC: 0.88 MG/DL (ref 0.76–1.27)
DEPRECATED RDW RBC AUTO: 42.4 FL (ref 37–54)
EOSINOPHIL # BLD AUTO: 0 10*3/MM3 (ref 0–0.4)
EOSINOPHIL NFR BLD AUTO: 0 % (ref 0.3–6.2)
ERYTHROCYTE [DISTWIDTH] IN BLOOD BY AUTOMATED COUNT: 12.8 % (ref 12.3–15.4)
ETHANOL BLD-MCNC: <10 MG/DL (ref 0–10)
ETHANOL UR QL: <0.01 %
GFR SERPL CREATININE-BSD FRML MDRD: 92 ML/MIN/1.73
GLOBULIN UR ELPH-MCNC: 3.3 GM/DL
GLUCOSE SERPL-MCNC: 149 MG/DL (ref 65–99)
HAV IGM SERPL QL IA: NORMAL
HBV CORE IGM SERPL QL IA: NORMAL
HBV SURFACE AG SERPL QL IA: NORMAL
HCT VFR BLD AUTO: 44.9 % (ref 37.5–51)
HCV AB SER DONR QL: NORMAL
HDLC SERPL-MCNC: 43 MG/DL (ref 40–60)
HGB BLD-MCNC: 14.7 G/DL (ref 13–17.7)
IMM GRANULOCYTES # BLD AUTO: 0.06 10*3/MM3 (ref 0–0.05)
IMM GRANULOCYTES NFR BLD AUTO: 0.5 % (ref 0–0.5)
LDLC SERPL CALC-MCNC: 188 MG/DL (ref 0–100)
LDLC/HDLC SERPL: 4.31 {RATIO}
LV EF 2D ECHO EST: 65 %
LV EF 2D ECHO EST: 65 %
LYMPHOCYTES # BLD AUTO: 1.12 10*3/MM3 (ref 0.7–3.1)
LYMPHOCYTES NFR BLD AUTO: 9.4 % (ref 19.6–45.3)
MAGNESIUM SERPL-MCNC: 1.8 MG/DL (ref 1.6–2.6)
MAXIMAL PREDICTED HEART RATE: 171 BPM
MAXIMAL PREDICTED HEART RATE: 171 BPM
MCH RBC QN AUTO: 29.6 PG (ref 26.6–33)
MCHC RBC AUTO-ENTMCNC: 32.7 G/DL (ref 31.5–35.7)
MCV RBC AUTO: 90.3 FL (ref 79–97)
MONOCYTES # BLD AUTO: 0.33 10*3/MM3 (ref 0.1–0.9)
MONOCYTES NFR BLD AUTO: 2.8 % (ref 5–12)
NEUTROPHILS NFR BLD AUTO: 10.38 10*3/MM3 (ref 1.7–7)
NEUTROPHILS NFR BLD AUTO: 87.1 % (ref 42.7–76)
NRBC BLD AUTO-RTO: 0 /100 WBC (ref 0–0.2)
PLATELET # BLD AUTO: 241 10*3/MM3 (ref 140–450)
PMV BLD AUTO: 12.2 FL (ref 6–12)
POTASSIUM SERPL-SCNC: 4 MMOL/L (ref 3.5–5.2)
PROT SERPL-MCNC: 7.8 G/DL (ref 6–8.5)
QT INTERVAL: 354 MS
QTC INTERVAL: 415 MS
RBC # BLD AUTO: 4.97 10*6/MM3 (ref 4.14–5.8)
SODIUM SERPL-SCNC: 136 MMOL/L (ref 136–145)
STRESS TARGET HR: 145 BPM
STRESS TARGET HR: 145 BPM
TRIGL SERPL-MCNC: 123 MG/DL (ref 0–150)
TSH SERPL DL<=0.05 MIU/L-ACNC: 0.92 UIU/ML (ref 0.27–4.2)
VLDLC SERPL-MCNC: 22 MG/DL (ref 5–40)
WBC NRBC COR # BLD: 11.91 10*3/MM3 (ref 3.4–10.8)

## 2022-01-19 PROCEDURE — 25010000002 MAGNESIUM SULFATE 2 GM/50ML SOLUTION: Performed by: INTERNAL MEDICINE

## 2022-01-19 PROCEDURE — 80053 COMPREHEN METABOLIC PANEL: CPT | Performed by: HOSPITALIST

## 2022-01-19 PROCEDURE — 25010000002 HEPARIN (PORCINE) PER 1000 UNITS: Performed by: HOSPITALIST

## 2022-01-19 PROCEDURE — 93325 DOPPLER ECHO COLOR FLOW MAPG: CPT

## 2022-01-19 PROCEDURE — 93312 ECHO TRANSESOPHAGEAL: CPT | Performed by: INTERNAL MEDICINE

## 2022-01-19 PROCEDURE — 80061 LIPID PANEL: CPT | Performed by: HOSPITALIST

## 2022-01-19 PROCEDURE — 93306 TTE W/DOPPLER COMPLETE: CPT | Performed by: INTERNAL MEDICINE

## 2022-01-19 PROCEDURE — 82077 ASSAY SPEC XCP UR&BREATH IA: CPT | Performed by: PHYSICIAN ASSISTANT

## 2022-01-19 PROCEDURE — 99213 OFFICE O/P EST LOW 20 MIN: CPT | Performed by: NURSE PRACTITIONER

## 2022-01-19 PROCEDURE — G0378 HOSPITAL OBSERVATION PER HR: HCPCS

## 2022-01-19 PROCEDURE — 85025 COMPLETE CBC W/AUTO DIFF WBC: CPT | Performed by: HOSPITALIST

## 2022-01-19 PROCEDURE — 25010000002 PROPOFOL 10 MG/ML EMULSION: Performed by: NURSE ANESTHETIST, CERTIFIED REGISTERED

## 2022-01-19 PROCEDURE — 96372 THER/PROPH/DIAG INJ SC/IM: CPT

## 2022-01-19 PROCEDURE — 93306 TTE W/DOPPLER COMPLETE: CPT

## 2022-01-19 PROCEDURE — 80074 ACUTE HEPATITIS PANEL: CPT | Performed by: PHYSICIAN ASSISTANT

## 2022-01-19 PROCEDURE — 96375 TX/PRO/DX INJ NEW DRUG ADDON: CPT

## 2022-01-19 PROCEDURE — 99217 PR OBSERVATION CARE DISCHARGE MANAGEMENT: CPT | Performed by: INTERNAL MEDICINE

## 2022-01-19 PROCEDURE — 83735 ASSAY OF MAGNESIUM: CPT | Performed by: PHYSICIAN ASSISTANT

## 2022-01-19 PROCEDURE — 93325 DOPPLER ECHO COLOR FLOW MAPG: CPT | Performed by: INTERNAL MEDICINE

## 2022-01-19 PROCEDURE — 97161 PT EVAL LOW COMPLEX 20 MIN: CPT

## 2022-01-19 PROCEDURE — 93312 ECHO TRANSESOPHAGEAL: CPT

## 2022-01-19 PROCEDURE — 25010000002 ONDANSETRON PER 1 MG: Performed by: PHYSICIAN ASSISTANT

## 2022-01-19 RX ORDER — MAGNESIUM SULFATE HEPTAHYDRATE 40 MG/ML
2 INJECTION, SOLUTION INTRAVENOUS ONCE
Status: COMPLETED | OUTPATIENT
Start: 2022-01-19 | End: 2022-01-19

## 2022-01-19 RX ORDER — LISINOPRIL 5 MG/1
5 TABLET ORAL
Qty: 30 TABLET | Refills: 0 | Status: SHIPPED | OUTPATIENT
Start: 2022-01-20 | End: 2022-01-28 | Stop reason: ALTCHOICE

## 2022-01-19 RX ORDER — ASPIRIN 325 MG
325 TABLET, DELAYED RELEASE (ENTERIC COATED) ORAL DAILY
Status: DISCONTINUED | OUTPATIENT
Start: 2022-01-19 | End: 2022-01-19 | Stop reason: HOSPADM

## 2022-01-19 RX ORDER — HEPARIN SODIUM 5000 [USP'U]/ML
5000 INJECTION, SOLUTION INTRAVENOUS; SUBCUTANEOUS EVERY 12 HOURS SCHEDULED
Status: DISCONTINUED | OUTPATIENT
Start: 2022-01-19 | End: 2022-01-19 | Stop reason: HOSPADM

## 2022-01-19 RX ORDER — SODIUM CHLORIDE, SODIUM LACTATE, POTASSIUM CHLORIDE, CALCIUM CHLORIDE 600; 310; 30; 20 MG/100ML; MG/100ML; MG/100ML; MG/100ML
INJECTION, SOLUTION INTRAVENOUS CONTINUOUS PRN
Status: DISCONTINUED | OUTPATIENT
Start: 2022-01-19 | End: 2022-01-19 | Stop reason: SURG

## 2022-01-19 RX ORDER — GABAPENTIN 300 MG/1
600 CAPSULE ORAL 3 TIMES DAILY
Status: DISCONTINUED | OUTPATIENT
Start: 2022-01-19 | End: 2022-01-19 | Stop reason: HOSPADM

## 2022-01-19 RX ORDER — SODIUM CHLORIDE 0.9 % (FLUSH) 0.9 %
10 SYRINGE (ML) INJECTION EVERY 12 HOURS SCHEDULED
Status: DISCONTINUED | OUTPATIENT
Start: 2022-01-19 | End: 2022-01-19 | Stop reason: HOSPADM

## 2022-01-19 RX ORDER — PRAVASTATIN SODIUM 40 MG
80 TABLET ORAL DAILY
Qty: 60 TABLET | Refills: 0 | Status: SHIPPED | OUTPATIENT
Start: 2022-01-19

## 2022-01-19 RX ORDER — LISINOPRIL 2.5 MG/1
5 TABLET ORAL
Status: DISCONTINUED | OUTPATIENT
Start: 2022-01-19 | End: 2022-01-19 | Stop reason: HOSPADM

## 2022-01-19 RX ORDER — PANTOPRAZOLE SODIUM 40 MG/1
40 TABLET, DELAYED RELEASE ORAL EVERY MORNING
Status: DISCONTINUED | OUTPATIENT
Start: 2022-01-19 | End: 2022-01-19 | Stop reason: HOSPADM

## 2022-01-19 RX ORDER — CLOPIDOGREL BISULFATE 75 MG/1
75 TABLET ORAL DAILY
Qty: 21 TABLET | Refills: 0 | Status: SHIPPED | OUTPATIENT
Start: 2022-01-20 | End: 2022-01-19

## 2022-01-19 RX ORDER — ONDANSETRON 2 MG/ML
4 INJECTION INTRAMUSCULAR; INTRAVENOUS EVERY 6 HOURS PRN
Status: DISCONTINUED | OUTPATIENT
Start: 2022-01-19 | End: 2022-01-19 | Stop reason: HOSPADM

## 2022-01-19 RX ORDER — PROPOFOL 10 MG/ML
VIAL (ML) INTRAVENOUS AS NEEDED
Status: DISCONTINUED | OUTPATIENT
Start: 2022-01-19 | End: 2022-01-19 | Stop reason: SURG

## 2022-01-19 RX ORDER — CALCIUM CARBONATE 200(500)MG
2 TABLET,CHEWABLE ORAL 3 TIMES DAILY PRN
Status: DISCONTINUED | OUTPATIENT
Start: 2022-01-19 | End: 2022-01-19 | Stop reason: HOSPADM

## 2022-01-19 RX ORDER — ASPIRIN 325 MG
325 TABLET, DELAYED RELEASE (ENTERIC COATED) ORAL DAILY
Qty: 21 TABLET | Refills: 0 | Status: SHIPPED | OUTPATIENT
Start: 2022-01-20 | End: 2022-02-10 | Stop reason: SDUPTHER

## 2022-01-19 RX ORDER — NICOTINE 21 MG/24HR
1 PATCH, TRANSDERMAL 24 HOURS TRANSDERMAL
Status: DISCONTINUED | OUTPATIENT
Start: 2022-01-19 | End: 2022-01-19 | Stop reason: HOSPADM

## 2022-01-19 RX ORDER — PROPOFOL 10 MG/ML
VIAL (ML) INTRAVENOUS CONTINUOUS PRN
Status: DISCONTINUED | OUTPATIENT
Start: 2022-01-19 | End: 2022-01-19

## 2022-01-19 RX ORDER — HYDROCODONE BITARTRATE AND ACETAMINOPHEN 10; 325 MG/1; MG/1
1 TABLET ORAL EVERY 6 HOURS PRN
Status: DISCONTINUED | OUTPATIENT
Start: 2022-01-19 | End: 2022-01-19 | Stop reason: HOSPADM

## 2022-01-19 RX ORDER — NITROGLYCERIN 0.4 MG/1
0.4 TABLET SUBLINGUAL
Status: DISCONTINUED | OUTPATIENT
Start: 2022-01-19 | End: 2022-01-19 | Stop reason: HOSPADM

## 2022-01-19 RX ORDER — PANTOPRAZOLE SODIUM 40 MG/1
40 TABLET, DELAYED RELEASE ORAL EVERY MORNING
Qty: 30 TABLET | Refills: 0 | Status: SHIPPED | OUTPATIENT
Start: 2022-01-19

## 2022-01-19 RX ORDER — CLONAZEPAM 0.5 MG/1
0.5 TABLET ORAL 2 TIMES DAILY
Status: DISCONTINUED | OUTPATIENT
Start: 2022-01-19 | End: 2022-01-19 | Stop reason: HOSPADM

## 2022-01-19 RX ORDER — SODIUM CHLORIDE 0.9 % (FLUSH) 0.9 %
10 SYRINGE (ML) INJECTION AS NEEDED
Status: DISCONTINUED | OUTPATIENT
Start: 2022-01-19 | End: 2022-01-19 | Stop reason: HOSPADM

## 2022-01-19 RX ORDER — ALBUTEROL SULFATE 90 UG/1
2 AEROSOL, METERED RESPIRATORY (INHALATION) EVERY 4 HOURS PRN
Status: DISCONTINUED | OUTPATIENT
Start: 2022-01-19 | End: 2022-01-19 | Stop reason: HOSPADM

## 2022-01-19 RX ORDER — CLOPIDOGREL BISULFATE 75 MG/1
75 TABLET ORAL DAILY
Qty: 30 TABLET | Refills: 0 | Status: SHIPPED | OUTPATIENT
Start: 2022-01-20 | End: 2022-01-19

## 2022-01-19 RX ORDER — HYDROXYZINE HYDROCHLORIDE 25 MG/1
25 TABLET, FILM COATED ORAL 3 TIMES DAILY PRN
Status: DISCONTINUED | OUTPATIENT
Start: 2022-01-19 | End: 2022-01-19 | Stop reason: HOSPADM

## 2022-01-19 RX ORDER — CHOLECALCIFEROL (VITAMIN D3) 125 MCG
10 CAPSULE ORAL NIGHTLY PRN
Status: DISCONTINUED | OUTPATIENT
Start: 2022-01-19 | End: 2022-01-19 | Stop reason: HOSPADM

## 2022-01-19 RX ORDER — CLOPIDOGREL BISULFATE 75 MG/1
75 TABLET ORAL DAILY
Qty: 21 TABLET | Refills: 0 | Status: SHIPPED | OUTPATIENT
Start: 2022-01-20

## 2022-01-19 RX ORDER — ASPIRIN 81 MG/1
81 TABLET ORAL DAILY
Qty: 30 TABLET | Refills: 0 | Status: SHIPPED | OUTPATIENT
Start: 2022-02-11 | End: 2022-01-28 | Stop reason: ALTCHOICE

## 2022-01-19 RX ADMIN — SODIUM CHLORIDE, POTASSIUM CHLORIDE, SODIUM LACTATE AND CALCIUM CHLORIDE: 600; 310; 30; 20 INJECTION, SOLUTION INTRAVENOUS at 12:30

## 2022-01-19 RX ADMIN — HEPARIN SODIUM 5000 UNITS: 5000 INJECTION INTRAVENOUS; SUBCUTANEOUS at 09:16

## 2022-01-19 RX ADMIN — PROPOFOL 40 MG: 10 INJECTION, EMULSION INTRAVENOUS at 12:40

## 2022-01-19 RX ADMIN — CLOPIDOGREL 75 MG: 75 TABLET, FILM COATED ORAL at 09:16

## 2022-01-19 RX ADMIN — ASPIRIN 81 MG: 81 TABLET, COATED ORAL at 10:40

## 2022-01-19 RX ADMIN — LISINOPRIL 5 MG: 2.5 TABLET ORAL at 10:40

## 2022-01-19 RX ADMIN — MAGNESIUM SULFATE IN WATER 2 G: 40 INJECTION, SOLUTION INTRAVENOUS at 09:56

## 2022-01-19 RX ADMIN — Medication 10 ML: at 09:30

## 2022-01-19 RX ADMIN — Medication 1 PATCH: at 09:17

## 2022-01-19 RX ADMIN — PANTOPRAZOLE SODIUM 40 MG: 40 TABLET, DELAYED RELEASE ORAL at 05:27

## 2022-01-19 RX ADMIN — ONDANSETRON 4 MG: 2 INJECTION INTRAMUSCULAR; INTRAVENOUS at 06:31

## 2022-01-19 RX ADMIN — PROPOFOL 40 MG: 10 INJECTION, EMULSION INTRAVENOUS at 12:36

## 2022-01-19 RX ADMIN — HYDROCODONE BITARTRATE AND ACETAMINOPHEN 1 TABLET: 10; 325 TABLET ORAL at 09:56

## 2022-01-19 RX ADMIN — PROPOFOL 40 MG: 10 INJECTION, EMULSION INTRAVENOUS at 12:46

## 2022-01-19 RX ADMIN — PROPOFOL 40 MG: 10 INJECTION, EMULSION INTRAVENOUS at 12:52

## 2022-01-19 RX ADMIN — GABAPENTIN 600 MG: 300 CAPSULE ORAL at 09:29

## 2022-01-19 RX ADMIN — CLONAZEPAM 0.5 MG: 0.5 TABLET ORAL at 09:30
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
## 2022-01-19 NOTE — SIGNIFICANT NOTE
01/19/22 1001   OTHER   Discipline speech language pathologist   Rehab Time/Intention   Session Not Performed other (see comments)  (Consult received. Chart reviewed. Pt NPO this am pend cardiology consult/possible DAMIAN per MD. SLP to f/u for clinical dysphagia assessment pend pt clearance to accept po. D/w RN pt status.)   Recommendations   SLP - Next Appointment 01/20/22

## 2022-01-19 NOTE — PROGRESS NOTES
Stroke Progress Note       Chief Complaint: Headache, blurry vision    Subjective    Subjective     Subjective:  No acute events overnight.  Patient reports he just feels better generally, still has trouble with vision of right eye, states he is still weak on the right side.    Review of Systems   Constitutional: Positive for activity change. Negative for chills and fever.   HENT: Negative.    Eyes: Positive for visual disturbance.   Respiratory: Negative.    Cardiovascular: Negative.    Gastrointestinal: Negative.    Musculoskeletal: Negative.    Skin: Negative.    Neurological: Positive for weakness and numbness. Negative for dizziness, seizures, syncope, facial asymmetry, speech difficulty and headaches.   Psychiatric/Behavioral: Negative.             Objective    Objective      Temp:  [97.3 °F (36.3 °C)-98.3 °F (36.8 °C)] 97.3 °F (36.3 °C)  Heart Rate:  [] 83  Resp:  [16-20] 20  BP: (103-196)/() 145/95        Neurological Exam  Mental Status  Awake and alert. Oriented to person, place and time. Recent and remote memory are intact. Speech is normal. Language is fluent with no aphasia. Attention and concentration are normal.    Cranial Nerves  CN II: Right visual acuity: finger movement. Left visual acuity: normal. Patient reports he only sees shadows of finger movement with right eye when left eye is closed. Left normal visual field.  CN III, IV, VI: Extraocular movements intact bilaterally. Normal lids and orbits bilaterally. Pupils equal round and reactive to light bilaterally.  CN V: Facial sensation is normal.  CN VII: Full and symmetric facial movement.  CN IX, X: Palate elevates symmetrically  CN XI: Shoulder shrug strength is normal.  CN XII: Tongue midline without atrophy or fasciculations.    Motor  Normal muscle bulk throughout. Normal muscle tone. No abnormal involuntary movements. Strength is 5/5 throughout all four extremities.    Sensory  Light touch abnormality:   Patient reports  decreased sensation to light touch on right leg and face, normal on right arm.    Coordination  No obvious dysmetria.    Gait  Not assessed.      Physical Exam  Vitals and nursing note reviewed.   Constitutional:       General: He is awake. He is not in acute distress.     Appearance: He is obese. He is not ill-appearing.   HENT:      Head: Normocephalic and atraumatic.      Mouth/Throat:      Mouth: Mucous membranes are moist.      Pharynx: Oropharynx is clear.   Eyes:      General: Lids are normal.      Extraocular Movements: Extraocular movements intact.      Pupils: Pupils are equal, round, and reactive to light.   Cardiovascular:      Rate and Rhythm: Normal rate and regular rhythm.   Pulmonary:      Effort: Pulmonary effort is normal. No respiratory distress.   Musculoskeletal:         General: Normal range of motion.      Cervical back: Normal range of motion.   Skin:     General: Skin is warm and dry.   Neurological:      Mental Status: He is alert.      Sensory: Sensory deficit present.      Motor: Weakness present.      Deep Tendon Reflexes: Strength normal.   Psychiatric:         Mood and Affect: Mood normal.         Speech: Speech normal.         Behavior: Behavior normal.         Hospital Meds:  Scheduled- aspirin, 81 mg, Oral, Daily  atorvastatin, 40 mg, Oral, Nightly  clonazePAM, 0.5 mg, Oral, BID  clopidogrel, 75 mg, Oral, Daily  gabapentin, 600 mg, Oral, TID  heparin (porcine), 5,000 Units, Subcutaneous, Q12H  lisinopril, 5 mg, Oral, Q24H  magnesium sulfate, 2 g, Intravenous, Once  nicotine, 1 patch, Transdermal, Q24H  pantoprazole, 40 mg, Oral, QAM  sodium chloride, 10 mL, Intravenous, Q12H      Infusions-     PRNs- •  albuterol sulfate HFA  •  calcium carbonate  •  hydrALAZINE  •  HYDROcodone-acetaminophen  •  hydrOXYzine  •  melatonin  •  nitroglycerin  •  ondansetron  •  sodium chloride  •  sodium chloride    Results Review:    I reviewed the patient's new clinical results.    Imaging Results  (Last 24 Hours)     Procedure Component Value Units Date/Time    MRI Brain With & Without Contrast [[id number]] Collected: 01/18/22 1555     Updated: 01/18/22 1615    Narrative:      EXAM:    MR Head Without and With Intravenous Contrast     EXAM DATE:    1/18/2022 2:45 PM     CLINICAL HISTORY:    loss of right eye vision, imbalance     TECHNIQUE:    Magnetic resonance images of the head/brain without and with  intravenous contrast in multiple planes.     COMPARISON:    CT same day     FINDINGS:    Brain:  No restricted diffusion to indicate acute infarct.  No mass  occupying lesions.  No pathologic intracranial contrast enhancement is  noted.  No hemorrhage.    Ventricles:  Unremarkable.  No ventriculomegaly.    Bones/joints:  Unremarkable.    Sinuses:  Mild ethmoid sinus disease.  No acute sinusitis.    Mastoid air cells:  Unremarkable as visualized.  No mastoid effusion.    Orbits:  Orbits are symmetric and appear unremarkable.       Impression:      1.  No acute intracranial findings identified.  2.  No pathologic contrast enhancement is noted.     This report was finalized on 1/18/2022 3:57 PM by Dr. Faustino Alvarez MD.       CT Angiogram Carotids [[id number]] Collected: 01/18/22 1220     Updated: 01/18/22 1223    Narrative:      EXAM:    CT Angiography Neck With Intravenous Contrast     EXAM DATE:    1/18/2022 11:53 AM     CLINICAL HISTORY:    Stroke, follow up     TECHNIQUE:    Axial computed tomographic angiography images of the neck with  intravenous contrast. LVO analysis was performed with [employer] software.   This CT exam was performed using one or more of the following dose  reduction techniques:  automated exposure control, adjustment of the mA  and/or kV according to patient size, and/or use of iterative  reconstruction technique.    MIP reconstructed images were created and reviewed.     COMPARISON:    No relevant prior studies available.     FINDINGS:      VASCULATURE:    Right common carotid artery:   Unremarkable.  No significant stenosis.   No dissection or occlusion.    Right internal carotid artery:  Very mild plaque right ICA proximal  aspect without evidence of significant stenosis or occlusion.    Right external carotid artery:  Unremarkable.  No occlusion.    Right vertebral artery:  Unremarkable.  No significant stenosis.  No  dissection or occlusion.       Left common carotid artery:  Unremarkable.  No significant stenosis.   No dissection or occlusion.    Left internal carotid artery:  Mild-moderate plaque proximal left ICA.  No occlusion or significant stenosis.    Left external carotid artery:  Unremarkable.  No occlusion.    Left vertebral artery:  Unremarkable.  No significant stenosis.  No  dissection or occlusion.    Other vasculature:  Three-vessel arch configuration is noted.      NECK:    Bones/joints:  Degenerative changes cervical spine with multilevel  central canal and neural foraminal stenosis most pronounced C5/6 and  C6/7.  No acute fracture.  No dislocation.    Soft tissues:  No enhancing soft tissue masses or fluid collections.    Lymph nodes:  No adenopathy by CT size criteria.    Lung apices:  Paraseptal and centrilobular emphysema of the upper  lungs.      CAROTID STENOSIS REFERENCE USING NASCET CRITERIA:    % ICA stenosis = (1 - narrowest ICA diameter/diameter of distal  cervical ICA) x 100.    Mild - <50% stenosis.    Moderate - 50-69% stenosis.    Severe - 70-94% stenosis.    Near occlusion - 95-99% stenosis.    Occluded - 100% stenosis.       Impression:      1.  Mild-moderate plaque left greater than right carotid systems. No  hemodynamically significant stenosis or occlusion.  2.  Other nonacute and incidental findings as detailed above.     This report was finalized on 1/18/2022 12:21 PM by Dr. Faustino Alvarez MD.       XR Chest 1 View [[id number]] Collected: 01/18/22 1219     Updated: 01/18/22 1223    Narrative:      EXAM:    XR Chest, 1 View     EXAM DATE:    1/18/2022  12:01 PM     CLINICAL HISTORY:    Acute Stroke Protocol (Onset < 12 hrs)     TECHNIQUE:    Frontal view of the chest.     COMPARISON:    No relevant prior studies available.     FINDINGS:    Lungs:  Calcified granuloma right lung. Lungs otherwise clear.    Pleural space:  Unremarkable.  No pneumothorax.    Heart:  Unremarkable.  No cardiomegaly.    Mediastinum:  Unremarkable.    Bones/joints:  Unremarkable.       Impression:        No acute cardiopulmonary findings.     This report was finalized on 1/18/2022 12:19 PM by Dr. Faustino Alvarez MD.       CT CEREBRAL PERFUSION WITH & WITHOUT CONTRAST [[id number]] Collected: 01/18/22 1218     Updated: 01/18/22 1221    Narrative:      EXAM:    CT Brain Perfusion Without and With Intravenous Contrast, [employer]  Protocol     EXAM DATE:    1/18/2022 11:53 AM     CLINICAL HISTORY:    Neuro deficit, acute, stroke suspected     TECHNIQUE:    Axial computed tomography images of the brain without and with  intravenous contrast using cerebral perfusion protocol.  Post-processing  parametric maps were created using [employer] software and reviewed.   Sagittal and coronal reformatted images were created and reviewed.  This  CT exam was performed using one or more of the following dose reduction  techniques:  automated exposure control, adjustment of the mA and/or kV  according to patient size, and/or use of iterative reconstruction  technique.     COMPARISON:    CT, CTA same day     FINDINGS:    Arterial input function:  Optimal.    Estimated infarct core, CBF < 30%:  0    Perfusion, Tmax > 6s:  0    Mismatch volume:  0    Mismatch ratio:  N/A    Perfusion, Tmax > 10s:  0       Brain:  Unremarkable.       Impression:        No acute brain perfusion abnormality.     This report was finalized on 1/18/2022 12:19 PM by Dr. Faustino Alvarez MD.       CT Angiogram Head [[id number]] Collected: 01/18/22 1213     Updated: 01/18/22 1220    Narrative:      EXAM:    CT Angiography Head With Intravenous  Contrast     EXAM DATE:    1/18/2022 11:52 AM     CLINICAL HISTORY:    Stroke, follow up     TECHNIQUE:    Axial computed tomographic angiography images of the head with  intravenous contrast. LVO analysis was performed with "[employer] software.   This CT exam was performed using one or more of the following dose  reduction techniques:  automated exposure control, adjustment of the mA  and/or kV according to patient size, and/or use of iterative  reconstruction technique.    MIP reconstructed images were created and reviewed.     COMPARISON:    CT same day     FINDINGS:    Right internal carotid artery:  There is mild calcified plaque of the  intracranial right ICA segments. No significant stenosis or occlusion.   No aneurysm.    Right anterior cerebral artery:  Unremarkable.  No occlusion or  significant stenosis.  No aneurysm.    Right middle cerebral artery:  Unremarkable.  No occlusion or  significant stenosis.  No aneurysm.    Right posterior cerebral artery:  Unremarkable.  No occlusion or  significant stenosis.  No aneurysm.    Right vertebral artery:  Unremarkable as visualized.       Left internal carotid artery:  There is mild calcified plaque of the  intracranial left ICA segments. No significant stenosis or occlusion.   No aneurysm.    Left anterior cerebral artery:  Unremarkable.  No occlusion or  significant stenosis.  No aneurysm.    Left middle cerebral artery:  Unremarkable.  No occlusion or  significant stenosis.  No aneurysm.    Left posterior cerebral artery:  Unremarkable.  No occlusion or  significant stenosis.  No aneurysm.    Left vertebral artery:  Unremarkable as visualized.       Basilar artery:  Unremarkable.  No occlusion or significant stenosis.   No aneurysm.          Impression:        Unremarkable CTA of the brain demonstrating no significant stenosis or  large vessel occlusion of the major intracranial arterial vasculature.     This report was finalized on 1/18/2022 12:18 PM by   Faustino Alvarez MD.       CT Head Without Contrast Stroke Protocol [[id number]] Collected: 01/18/22 1146     Updated: 01/18/22 1149    Narrative:      EXAM:    CT Head Without Intravenous Contrast     EXAM DATE:    1/18/2022 11:36 AM     CLINICAL HISTORY:    Neuro deficit, acute, stroke suspected     TECHNIQUE:    Axial computed tomography images of the head/brain without intravenous  contrast.  Sagittal and coronal reformatted images were created and  reviewed.  This CT exam was performed using one or more of the following  dose reduction techniques:  automated exposure control, adjustment of  the mA and/or kV according to patient size, and/or use of iterative  reconstruction technique.     COMPARISON:    No relevant prior studies available.     FINDINGS:    Brain:  No convincing evidence of extra-axial hematoma.  No brain  parenchymal hemorrhages.  No significant white matter disease.    Midline shift:  No midline shift.    Ventricles:  No hydrocephalus.    Bones/joints:  Unremarkable.  No acute fracture.    Soft tissues:  Unremarkable.    Sinuses:  Unremarkable as visualized.  No acute sinusitis.    Mastoid air cells:  Unremarkable as visualized.  No mastoid effusion.    Other findings:  Falcine calcifications are noted.       Impression:        No CT evidence of acute intracranial abnormality.     Findings communicated to Dr. Perea, ER physician, at 11:47 AM on  01/18/2022.     This report was finalized on 1/18/2022 11:47 AM by Dr. Faustino Alvarez MD.           Results for orders placed during the hospital encounter of 01/18/22    Adult Transthoracic Echo Complete W/ Cont if Necessary Per Protocol    Interpretation Summary  · Estimated left ventricular EF = 65% Left ventricular ejection fraction appears to be 61 - 65%. Left ventricular systolic function is normal.  · Left ventricular diastolic function is consistent with (grade I) impaired relaxation.  · No significant mitral and aortic valve abnormality  · No  pericardial effusion  · No prev study            Assessment/Plan     Assessment/Plan:  51-year-old white male with known PMHx significant for hypertension, prediabetes, history of seizures not taking any medications presents to [facility] ED on 1/18/2022 with complaints of headache, dizziness, right-sided blurry vision, weakness on the right side that started the night before.  Initial NIH was 3.  CTh/CTP/CTA all negative for acute findings, personally reviewed by Dr. Ulloa.  Denies any head trauma.  MRI brain with and without on 1/18/2022 did not show any acute intracranial findings, no pathologic contrast enhancement was noted.  He was seen in the ED by ophthalmology who recommended DAMIAN to rule out embolic source, and aspirin.  He is to follow-up with Dr. Manuel in February.      Strokelike symptoms, right-sided sensory deficit and weakness are still present but improving.TIA vs migraine in setting of hypertension and anxiety.  TTE was okay.  No acute infarct on MRI.  Non-arteritic ischemic optic neuropathy  Carotid atherosclerosis  Prediabetes, last A1c 5.9  History of seizure disorder  Hyperlipidemia, last   -SLP/PT/OT  -Lipitor 80 mg daily  -Healthy heart diet  -Normal blood pressure goals  -Continue DAPT with Plavix 75 mg and aspirin 325 mg daily x3 weeks, then single agent therapy with aspirin 81 mg daily  -Follow-up with outpatient neurology and ophthalmology  -No driving until cleared by ophthalmology    Discussed plan of care with patient as well as individual stroke risk factors hyperlipidemia, hypertension, smoking, physical inactivity and/or obesity and TIA's. We also discussed medications that the patient would be discharged on aspirin 325 mg orally every day, clopidogrel 75 mg orally every day and Lipitor/atorvastatin 80 mg orally every day and the importance of medication compliance in prevention of future strokes. He voices his understanding. Stroke signs and symptoms were  reviewed with the patient (F.A.S.T., visual disturbances, dizziness/loss of balance, mental status changes, and severe headache) and he  has been instructed to call 911 if symptoms of TIA/Stroke occur.        The case was discussed with the patient, Dr. Ulloa, will discuss with nursing and hospitalist.  Stroke neurology will sign off for now, please feel free to call with any questions.  Thank you again for the consult.    Faustino Capellan, ISABEL  01/19/22  11:04 EST
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
## 2022-01-19 NOTE — DISCHARGE INSTR - APPOINTMENTS
Follow up appointment with MARYCHUY HALL on Jan 26 @ 9:15 am  Follow up appointment with Haley on Feb 10 @ 9 am  Follow up appointment with Kaleb as previously scheduled on Feb 22  Follow up appointment with Neurology will call pt tomorrow 1/20/22 with appointment date and time, Jan 28 @ 9 am

## 2022-01-19 NOTE — CASE MANAGEMENT/SOCIAL WORK
Discharge Planning Assessment   Shaheen     Patient Name: Girish Loja  MRN: [id number]  Today's Date: 1/19/2022    Admit Date: 1/18/2022       Discharge Plan     Row Name 01/19/22 1711       Plan    Plan Pt to be discharged home on this with Physician ordered rolling walker.  Pt stated no preference on DME provider.  SS made referral to [facility] per Lor.  Walker to be delivered to pt's home as requested by pt.    Row Name 01/19/22 4970       Plan    Final Discharge Disposition Code 01 - home or self-care    Final Note Pt to be discharged home.              Continued Care and Services - Admitted Since 1/18/2022    Coordination has not been started for this encounter.       Expected Discharge Date and Time     Expected Discharge Date Expected Discharge Time    Jan 19, 2022          Demographic Summary    No documentation.                Functional Status    No documentation.                Psychosocial    No documentation.                Abuse/Neglect    No documentation.                Legal    No documentation.                Substance Abuse    No documentation.                Patient Forms    No documentation.                   Franchesca Arredondo, FELISHAW

## 2022-01-19 NOTE — H&P
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 mellitus  -Hemoglobin A1c 5.9 indicated of of prediabetes  -Recommend OP follow-up with PCP for monitoring    #Elevated ALT  -ALT 49; previously elevated in 2019 at 44  -Obtain acute hepatitis panel and blood ethanol level  Repeat a.m. CMP and monitor LFTs closely-    #CKD stage IIIa  -Baseline creatinine appears to be around 0.6-0.7; creatinine admission 0.7  -Repeat a.m. CMP and monitor renal function closely    #Essential hypertension  -BP has been somewhat uncontrolled in the emergency department; does not appear to have received any antihypertensive agents while in the ED; last reading 139/85  -Review home medications once available; based off of current list does not appear to be on any antihypertensives  -P.o. hydralazine as needed for SBP greater than 170    #Reported history of seizures   -Patient states he took himself off of anticonvulsant medications  -Last seizure 1 year ago  -Seizure precautions in place    #Anxiety  -Atarax as needed    #Smoking tobacco use  -Smoking cessation counseling given  -Nicotine replacement ordered    F/E/N: No IV fluids.  Replace ultralights as necessary.  Consistent  carb diet; n.p.o. 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  In such, I feel patient’s risk for adverse outcomes and need for care warrant INPATIENT evaluation and predict the patient’s care encounter to likely last beyond 2 midnights.    Code Status: FULL CODE     Disposition/Discharge planning: Plan for home at discharge. Pending clinical course     I have discussed the patient's assessment and plan with the patient, nursing staff, and attending physician       Kori Joseph PA-C  Hospitalist Service -- [facility]       01/18/22  21:50 EST    Attending Physician: Henry Hawley MD

## 2022-01-19 NOTE — DISCHARGE PLACEMENT REQUEST
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 visualized.       Left internal carotid artery:  There is mild calcified plaque of the  intracranial left ICA segments. No significant stenosis or occlusion.   No aneurysm.    Left anterior cerebral artery:  Unremarkable.  No occlusion or  significant stenosis.  No aneurysm.    Left middle cerebral artery:  Unremarkable.  No occlusion or  significant stenosis.  No aneurysm.    Left posterior cerebral artery:  Unremarkable.  No occlusion or  significant stenosis.  No aneurysm.    Left vertebral artery:  Unremarkable as visualized.       Basilar artery:  Unremarkable.  No occlusion or significant stenosis.   No aneurysm.          Impression:        Unremarkable CTA of the brain demonstrating no significant stenosis or  large vessel occlusion of the major intracranial arterial vasculature.     This report was finalized on 1/18/2022 12:18 PM by Dr. Faustino Alvarez MD.       CT Head Without Contrast  Stroke Protocol [[id number]] Collected: 01/18/22 1146     Updated: 01/18/22 1149    Narrative:      EXAM:    CT Head Without Intravenous Contrast     EXAM DATE:    1/18/2022 11:36 AM     CLINICAL HISTORY:    Neuro deficit, acute, stroke suspected     TECHNIQUE:    Axial computed tomography images of the head/brain without intravenous  contrast.  Sagittal and coronal reformatted images were created and  reviewed.  This CT exam was performed using one or more of the following  dose reduction techniques:  automated exposure control, adjustment of  the mA and/or kV according to patient size, and/or use of iterative  reconstruction technique.     COMPARISON:    No relevant prior studies available.     FINDINGS:    Brain:  No convincing evidence of extra-axial hematoma.  No brain  parenchymal hemorrhages.  No significant white matter disease.    Midline shift:  No midline shift.    Ventricles:  No hydrocephalus.    Bones/joints:  Unremarkable.  No acute fracture.    Soft tissues:  Unremarkable.    Sinuses:  Unremarkable as visualized.  No acute sinusitis.    Mastoid air cells:  Unremarkable as visualized.  No mastoid effusion.    Other findings:  Falcine calcifications are noted.       Impression:        No CT evidence of acute intracranial abnormality.     Findings communicated to Dr. Perea, ER physician, at 11:47 AM on  01/18/2022.     This report was finalized on 1/18/2022 11:47 AM by Dr. Faustino Alvarez MD.           I have personally reviewed the above radiology results.     ---------------------------------------------------------------------------------------------------------------------    Assessment & Plan      Active Hospital Problems    Diagnosis  POA   • **Ischemic optic neuropathy [H47.019]  Yes     #Non-arteritic ischemic optic neuropathy  #?  TIA  #Mild to moderate plaque of L>R carotid systems   -Imaging reviewed; unremarkable for acute intracranial findings  -Patient has been evaluated in the emergency  department by neurology and ophthalmology  -Ophthalmology recommended DAMIAN to rule out an embolic source and adequate blood pressure control; DAMIAN ordered as well as as needed antihypertensive agents; review home medications once available, plan to continue antihypertensive agents with appropriate holding parameters  -Per neurology, maintain BP <130/80  -Loading dose aspirin received in the ED; continue with daily aspirin, Plavix, and statin  -A.m. lipid panel ordered; hemoglobin A1c 5.9 indicated of prediabetes  -Neurochecks every 4 hours  -PT/OT/SLP consulted  -Recommend OP follow-up with neurology and ophthalmology  -Neurology and ophthalmology further input/assistance is much appreciated  -We will also consult cardiology for DAMIAN to rule out cardiac embolus    #Hyperglycemia with no known history of diabetes mellitus  -Hemoglobin A1c 5.9 indicated of of prediabetes  -Recommend OP follow-up with PCP for monitoring    #Elevated ALT  -ALT 49; previously elevated in 2019 at 44  -Obtain acute hepatitis panel and blood ethanol level  Repeat a.m. CMP and monitor LFTs closely-    #CKD stage IIIa  -Baseline creatinine appears to be around 0.6-0.7; creatinine admission 0.7  -Repeat a.m. CMP and monitor renal function closely    #Essential hypertension  -BP has been somewhat uncontrolled in the emergency department; does not appear to have received any antihypertensive agents while in the ED; last reading 139/85  -Review home medications once available; based off of current list does not appear to be on any antihypertensives  -P.o. hydralazine as needed for SBP greater than 170    #Reported history of seizures   -Patient states he took himself off of anticonvulsant medications  -Last seizure 1 year ago  -Seizure precautions in place    #Anxiety  -Atarax as needed    #Smoking tobacco use  -Smoking cessation counseling given  -Nicotine replacement ordered    F/E/N: No IV fluids.  Replace ultralights as necessary.  Consistent  carb diet; n.p.o. 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  In such, I feel patient’s risk for adverse outcomes and need for care warrant INPATIENT evaluation and predict the patient’s care encounter to likely last beyond 2 midnights.    Code Status: FULL CODE     Disposition/Discharge planning: Plan for home at discharge. Pending clinical course     I have discussed the patient's assessment and plan with the patient, nursing staff, and attending physician       Kori Joseph PA-C  Hospitalist Service -- [facility]       01/18/22  21:50 EST    Attending Physician: Henry Hawley MD       Electronically signed by Henry Hawley MD at 01/19/22 0154       Discharge Summary    No notes of this type exist for this encounter.

## 2022-01-19 NOTE — CASE MANAGEMENT/SOCIAL WORK
Discharge Planning Assessment  [facility]     Patient Name: Girish Loja  MRN: [id number]  Today's Date: 1/19/2022    Admit Date: 1/18/2022     Discharge Needs Assessment     Row Name 01/19/22 0858       Living Environment    Lives With spouse; sibling(s)    Name(s) of Who Lives With Patient Lives at home with spouse and sister.    Current Living Arrangements home/apartment/condo    Primary Care Provided by self    Provides Primary Care For no one    Family Caregiver if Needed sibling(s); spouse    Quality of Family Relationships helpful; involved; supportive    Able to Return to Prior Arrangements yes       Resource/Environmental Concerns    Resource/Environmental Concerns none       Transition Planning    Patient/Family Anticipates Transition to home with family    Transportation Anticipated family or friend will provide       Discharge Needs Assessment    Equipment Currently Used at Home none    Equipment Needed After Discharge walker, rolling               Discharge Plan     Row Name 01/19/22 0859       Plan    Plan Pt admitted on 1/18/22.  SS received consult per [facility] for discharge planning.  SS spoke with pt on this date.  Pt lives at home with spouse and sister and plans to return home at discharge.  Pt currently does not utilize home health services or DME.  Pt requests rolling walker at discharge.  Pt's PCP is Madhuri White.  SS will follow and assist as needed.              Continued Care and Services - Admitted Since 1/18/2022    Coordination has not been started for this encounter.          Demographic Summary     Row Name 01/19/22 0857       General Information    Admission Type inpatient    Referral Source nursing    Reason for Consult discharge planning    Preferred Language English                JHON Alex

## 2022-01-19 NOTE — DISCHARGE INSTR - DIET
As tolerated  
Asthma    Esophageal reflux    HLD (hyperlipidemia)    Lung cancer  Right lung.  Rheumatoid arthritis    TIA (transient ischemic attack)

## 2022-01-19 NOTE — PLAN OF CARE
Goal Outcome Evaluation:      Patient is a new admit from ER tonight. Denies chest pain or shortness of breath at this time. VSS. Right sided weakness noted. Will continue to monitor and follow plan of care.

## 2022-01-19 NOTE — DISCHARGE SUMMARY
[facility] DISCHARGE SUMMARY      Date of Admission: 1/18/2022    Date of Discharge: 1/19/2022     PCP: Madhuri White APRN    Admission Diagnosis:   Please see admission H&P    Discharge Diagnosis:   Non-arteritic ischemic optic neuropathy right eye  Possible TIA  Essential HTN  Hyperglycemia/prediabetes  Mild-moderate plaque L>R carotid systems   CKD IIIa  Tobacco abuse  Hx of seizure disorder    Procedures Performed:  1/19/22: Transesophageal echocardiogram  · Estimated left ventricular EF = 65% Left ventricular ejection fraction appears to be 61 - 65%. Left ventricular systolic function is normal.  · Normal RV size and function  · RA and LA are normal  · Aortic and mitral valves show normal structure and motion without significant MR  · LA appendage is narrow and normal  · Saline contrast study showed negative contrast suggesting Left to Right shuntin via PFO but no crossing of bubbles. There was no ASD and valsalva maneuver could not be performed due to heavy sedation  · Study compared to TTE of this admission and no change noted except finding of PFO with Left to right shunting.  · A second jet of blood flow noted in the right ventricle which may be due to atrio-ventricular inflow.     Consults:   Consults     Date and Time Order Name Status Description    1/19/2022  3:09 AM Inpatient Cardiology Consult Completed     1/19/2022  3:09 AM Inpatient Ophthalmology Consult      1/19/2022  3:09 AM Inpatient Neurology Consult Stroke      1/18/2022 11:31 AM Inpatient Neurology Consult Stroke Completed     1/18/2022 11:31 AM Inpatient Neurology Consult Stroke Completed             History of Present Illness:  Girish Loja is a 51 y.o. male who presented to [facility] ED with CC of blurred vision. Please see admission H&P for complete details.     In the ED, CT head was negative for any acute findings. CT perfusion study did not reveal any acute findings. CTA head was negative for any acute findings. CT  angiogram of the carotids revealed mild to moderate plaque left greater than right carotid systems with no hemodynamically significant stenosis or occlusion. MRI of the brain was negative for any acute findings. He was given full dose ASA, steroids, Benadryl and antiemetics. Teleneurology was consulted who recommended antiplatelet therapy and outpatient follow up. Ophthalmology was contacted and evaluated him in the ED with concern for non-arteritic ischemic optic neuropathy of the right eye. They recommended ASA, a DAMIAN to rule out an embolic source and good blood pressure control with plans for outpatient follow up.            Hospital Course  Girish Loja was admitted to the telemetry floor for further evaluation and treatment. He was continued on ASA, Plavix and a statin. Teleneurology and cardiology were consulted for further input.     A transthoracic echocardiogram was obtained revealing an EF of 61-65% and grade I diastolic dysfunction. HgbA1c was 5.9. Lipid panel revealed elevated total cholesterol and LDL. A DAMIAN was performed with saline contrast study showing negative contrast suggesting left to right shunting via PFO but no crossing of bubbles. There was no ASD and valsalva maneuver could not be performed due to heavy sedation. Lisinopril was added for BP control. He reported some myalgias with Lipitor and he was transitioned to pravastatin at discharge after discussion with pharmacy. Neurology recommended DABT with full dose ASA and Plavix x 3 weeks followed by single agent therapy with ASA 81mg daily.     Mr. Loja was eager to go home and deemed medically stable for discharge after review with consultants. He was prescribed ASA, Plavix, a statin and lisinopril as outlined. Smoking cessation was encouraged. Instructions were given for follow up with his PCP, neurology, ophthalmology and cardiology.        Condition on Discharge:  Stable    Vital Signs  Vitals:    01/19/22 1327   BP: 151/100   Pulse:  90   Resp: 18   Temp: 97.7 °F (36.5 °C)   SpO2: 98%       Physical Exam:  General:    Awake, alert, in no acute distress   Heart:      Normal S1 and S2. Regular rate and rhythm. No significant murmur, rubs or gallops appreciated.   Lungs:     Respirations regular, even and unlabored. Lungs clear to auscultation B/L. No wheezes, rales or rhonchi.   Abdomen:   Soft and nontender. No guarding, rebound tenderness or  organomegaly noted. Bowel sounds present x 4.   Extremities:  No clubbing, cyanosis or edema noted. Decreased strength and sensation on right side.      Discharge Disposition:   home      Discharge Medications:     Discharge Medications      New Medications      Instructions Start Date   aspirin  MG tablet   325 mg, Oral, Daily   Start Date: January 20, 2022     aspirin 81 MG EC tablet   81 mg, Oral, Daily   Start Date: February 11, 2022     clopidogrel 75 MG tablet  Commonly known as: PLAVIX  Notes to patient: For 21 days   75 mg, Oral, Daily   Start Date: January 20, 2022     lisinopril 5 MG tablet  Commonly known as: PRINIVIL,ZESTRIL   5 mg, Oral, Every 24 Hours Scheduled   Start Date: January 20, 2022     pantoprazole 40 MG EC tablet  Commonly known as: PROTONIX  Replaces: omeprazole 20 MG capsule   40 mg, Oral, Every Morning      pravastatin 40 MG tablet  Commonly known as: Pravachol   80 mg, Oral, Daily         Continue These Medications      Instructions Start Date   albuterol sulfate  (90 Base) MCG/ACT inhaler  Commonly known as: PROVENTIL HFA;VENTOLIN HFA;PROAIR HFA   2 puffs, Inhalation, Every 4 Hours PRN      clonazePAM 0.5 MG tablet  Commonly known as: KlonoPIN   0.5 mg, Oral, 2 Times Daily      gabapentin 600 MG tablet  Commonly known as: NEURONTIN   600 mg, Oral, 3 Times Daily      HYDROcodone-acetaminophen  MG per tablet  Commonly known as: NORCO   1 tablet, Oral, Every 6 Hours PRN         Stop These Medications    omeprazole 20 MG capsule  Commonly known as:  priLOSEC  Replaced by: pantoprazole 40 MG EC tablet              Discharge Diet:     Dietary Orders (From admission, onward)     Start     Ordered    01/19/22 1557  Diet Regular; Cardiac  Diet Effective Now        Question Answer Comment   Diet Texture / Consistency Regular    Common Modifiers Cardiac        01/19/22 1556                Activity at Discharge:  No driving until cleared as an outpatient     Follow-up Appointments:  Additional Instructions for the Follow-ups that You Need to Schedule     Discharge Follow-up with PCP   As directed       Currently Documented PCP:    Madhuri White APRN    PCP Phone Number:    [phone]     Follow Up Details: Follow up with ISABEL Denny in 1 week.         Discharge Follow-up with Specified Provider: Follow up with Dr. De Leon in 2-4 weeks.   As directed      To: Follow up with Dr. De Leon in 2-4 weeks.         Discharge Follow-up with Specified Provider: Follow up with Dr. Yousuf Manuel as scheduled on 2/22/22.   As directed      To: Follow up with Dr. Yousuf Manuel as scheduled on 2/22/22.         Discharge Follow-up with Specified Provider: Follow up with neurology in 2-4 weeks.   As directed      To: Follow up with neurology in 2-4 weeks.            Follow-up Information     [facility] NEUROLOGY. Schedule an appointment as soon as possible for a visit.    Specialty: Neurology  Why: 4-6 weeks  Contact information:  [address] [facility] [address]  [facility] [address]  [phone]           Madhuri White APRN .    Specialty: Nurse Practitioner  Why: Follow up with ISABEL Denny in 1 week.  Contact information:  [address] [facility] [address] [phone]                       Your Scheduled Appointments    Feb 10, 2022  9:00 AM  Follow Up with ISABEL Ch  [facility] CARDIOLOGY ([place]) [address] [place] [address] [address]  [facility] [address]  [phone]   -Bring photo ID, insurance card, and list  of medications to appointment  -If testing was completed outside of [facility] then patient must bring images on a disc  -Copay will be collected at time of appointment  -Established patients should arrive at time of appointment     Additional instructions:    Follow up appointment with MARYCHUY HALL on Jan 26 @ 9:15 am  Follow up appointment with Haley on Feb 10 @ 9 am  Follow up appointment with Kaleb as previously scheduled on Feb 22  Follow up appointment with Neurology will call pt tomorrow 1/20/22 with appointment date and time              Test Results Pending at Discharge:  None     Armando Najera DO  01/19/22  15:55 EST      Time: Greater than 30 minutes spent on this discharge.
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
## 2022-01-19 NOTE — CASE MANAGEMENT/SOCIAL WORK
Discharge Planning Assessment   Shaheen     Patient Name: Girish Loja  MRN: [id number]  Today's Date: 1/19/2022    Admit Date: 1/18/2022       Discharge Plan     Row Name 01/19/22 1557       Plan    Final Discharge Disposition Code 01 - home or self-care    Final Note Pt to be discharged home.              FELISHA AlexW

## 2022-01-19 NOTE — ANESTHESIA PREPROCEDURE EVALUATION
Anesthesia Evaluation     Patient summary reviewed and Nursing notes reviewed   NPO Solid Status: > 8 hours  NPO Liquid Status: > 8 hours           Airway   Mallampati: I  TM distance: >3 FB  Neck ROM: full  Dental    (+) edentulous    Pulmonary    (+) a smoker Current Smoked day of surgery, decreased breath sounds,   Cardiovascular - normal exam  Exercise tolerance: poor (<4 METS)    ECG reviewed    (+) hypertension,       Neuro/Psych  (+) seizures,     GI/Hepatic/Renal/Endo      Musculoskeletal     Abdominal  - normal exam   Substance History      OB/GYN          Other                        Anesthesia Plan    ASA 3     general   total IV anesthesia    Anesthetic plan, all risks, benefits, and alternatives have been provided, discussed and informed consent has been obtained with: patient.  Use of blood products discussed with patient .       CODE STATUS:    Level Of Support Discussed With: Patient  Code Status (Patient has no pulse and is not breathing): CPR (Attempt to Resuscitate)  Medical Interventions (Patient has pulse or is breathing): Full Support

## 2022-01-20 ENCOUNTER — APPOINTMENT (OUTPATIENT)
Dept: CT IMAGING | Facility: HOSPITAL | Age: 52
End: 2022-01-20

## 2022-01-20 ENCOUNTER — HOSPITAL ENCOUNTER (EMERGENCY)
Facility: HOSPITAL | Age: 52
Discharge: HOME | End: 2022-01-20
Attending: STUDENT IN AN ORGANIZED HEALTH CARE EDUCATION/TRAINING PROGRAM | Admitting: EMERGENCY MEDICINE

## 2022-01-20 VITALS
HEIGHT: 68 IN | BODY MASS INDEX: 27.28 KG/M2 | DIASTOLIC BLOOD PRESSURE: 82 MMHG | TEMPERATURE: 98.5 F | WEIGHT: 180 LBS | OXYGEN SATURATION: 98 % | RESPIRATION RATE: 20 BRPM | SYSTOLIC BLOOD PRESSURE: 131 MMHG | HEART RATE: 92 BPM

## 2022-01-20 DIAGNOSIS — G45.9 TIA (TRANSIENT ISCHEMIC ATTACK): ICD-10-CM

## 2022-01-20 DIAGNOSIS — G44.209 TENSION-TYPE HEADACHE, NOT INTRACTABLE, UNSPECIFIED CHRONICITY PATTERN: Primary | ICD-10-CM

## 2022-01-20 LAB
ALBUMIN SERPL-MCNC: 4.49 G/DL (ref 3.5–5.2)
ALBUMIN/GLOB SERPL: 1.4 G/DL
ALP SERPL-CCNC: 79 U/L (ref 39–117)
ALT SERPL W P-5'-P-CCNC: 51 U/L (ref 1–41)
AMPHET+METHAMPHET UR QL: NEGATIVE
AMPHETAMINES UR QL: NEGATIVE
ANION GAP SERPL CALCULATED.3IONS-SCNC: 12.7 MMOL/L (ref 5–15)
AST SERPL-CCNC: 29 U/L (ref 1–40)
BARBITURATES UR QL SCN: NEGATIVE
BENZODIAZ UR QL SCN: NEGATIVE
BILIRUB SERPL-MCNC: 0.3 MG/DL (ref 0–1.2)
BILIRUB UR QL STRIP: NEGATIVE
BUN SERPL-MCNC: 13 MG/DL (ref 6–20)
BUN/CREAT SERPL: 17.3 (ref 7–25)
BUPRENORPHINE SERPL-MCNC: NEGATIVE NG/ML
CALCIUM SPEC-SCNC: 9.7 MG/DL (ref 8.6–10.5)
CANNABINOIDS SERPL QL: NEGATIVE
CHLORIDE SERPL-SCNC: 103 MMOL/L (ref 98–107)
CLARITY UR: CLEAR
CO2 SERPL-SCNC: 23.3 MMOL/L (ref 22–29)
COCAINE UR QL: NEGATIVE
COLOR UR: YELLOW
CREAT SERPL-MCNC: 0.75 MG/DL (ref 0.76–1.27)
DEPRECATED RDW RBC AUTO: 43.2 FL (ref 37–54)
EOSINOPHIL # BLD MANUAL: 0.5 10*3/MM3 (ref 0–0.4)
EOSINOPHIL NFR BLD MANUAL: 4 % (ref 0.3–6.2)
ERYTHROCYTE [DISTWIDTH] IN BLOOD BY AUTOMATED COUNT: 13.2 % (ref 12.3–15.4)
ETHANOL BLD-MCNC: <10 MG/DL (ref 0–10)
ETHANOL UR QL: <0.01 %
FLUAV SUBTYP SPEC NAA+PROBE: NOT DETECTED
FLUBV RNA ISLT QL NAA+PROBE: NOT DETECTED
GFR SERPL CREATININE-BSD FRML MDRD: 111 ML/MIN/1.73
GLOBULIN UR ELPH-MCNC: 3.1 GM/DL
GLUCOSE SERPL-MCNC: 89 MG/DL (ref 65–99)
GLUCOSE UR STRIP-MCNC: NEGATIVE MG/DL
HCT VFR BLD AUTO: 44.1 % (ref 37.5–51)
HGB BLD-MCNC: 14.6 G/DL (ref 13–17.7)
HGB UR QL STRIP.AUTO: NEGATIVE
INR PPP: 0.96 (ref 0.9–1.1)
KETONES UR QL STRIP: NEGATIVE
LEUKOCYTE ESTERASE UR QL STRIP.AUTO: NEGATIVE
LYMPHOCYTES # BLD MANUAL: 4.23 10*3/MM3 (ref 0.7–3.1)
LYMPHOCYTES NFR BLD MANUAL: 6 % (ref 5–12)
MAGNESIUM SERPL-MCNC: 2.2 MG/DL (ref 1.6–2.6)
MCH RBC QN AUTO: 29.9 PG (ref 26.6–33)
MCHC RBC AUTO-ENTMCNC: 33.1 G/DL (ref 31.5–35.7)
MCV RBC AUTO: 90.4 FL (ref 79–97)
METHADONE UR QL SCN: NEGATIVE
MONOCYTES # BLD: 0.75 10*3/MM3 (ref 0.1–0.9)
NEUTROPHILS # BLD AUTO: 6.96 10*3/MM3 (ref 1.7–7)
NEUTROPHILS NFR BLD MANUAL: 49 % (ref 42.7–76)
NEUTS BAND NFR BLD MANUAL: 7 % (ref 0–5)
NITRITE UR QL STRIP: NEGATIVE
NT-PROBNP SERPL-MCNC: 16.9 PG/ML (ref 0–450)
OPIATES UR QL: POSITIVE
OXYCODONE UR QL SCN: NEGATIVE
PCP UR QL SCN: NEGATIVE
PH UR STRIP.AUTO: 7.5 [PH] (ref 5–8)
PLAT MORPH BLD: NORMAL
PLATELET # BLD AUTO: 222 10*3/MM3 (ref 140–450)
PMV BLD AUTO: 11.5 FL (ref 6–12)
POTASSIUM SERPL-SCNC: 4 MMOL/L (ref 3.5–5.2)
PROPOXYPH UR QL: NEGATIVE
PROT SERPL-MCNC: 7.6 G/DL (ref 6–8.5)
PROT UR QL STRIP: NEGATIVE
PROTHROMBIN TIME: 13.2 SECONDS (ref 12.8–14.5)
RBC # BLD AUTO: 4.88 10*6/MM3 (ref 4.14–5.8)
RBC MORPH BLD: NORMAL
SARS-COV-2 RNA PNL SPEC NAA+PROBE: NOT DETECTED
SODIUM SERPL-SCNC: 139 MMOL/L (ref 136–145)
SP GR UR STRIP: 1.01 (ref 1–1.03)
TRICYCLICS UR QL SCN: NEGATIVE
TROPONIN T SERPL-MCNC: <0.01 NG/ML (ref 0–0.03)
UROBILINOGEN UR QL STRIP: NORMAL
VARIANT LYMPHS NFR BLD MANUAL: 34 % (ref 19.6–45.3)
WBC NRBC COR # BLD: 12.43 10*3/MM3 (ref 3.4–10.8)

## 2022-01-20 PROCEDURE — 83735 ASSAY OF MAGNESIUM: CPT | Performed by: EMERGENCY MEDICINE

## 2022-01-20 PROCEDURE — 70450 CT HEAD/BRAIN W/O DYE: CPT

## 2022-01-20 PROCEDURE — 87636 SARSCOV2 & INF A&B AMP PRB: CPT | Performed by: EMERGENCY MEDICINE

## 2022-01-20 PROCEDURE — 80306 DRUG TEST PRSMV INSTRMNT: CPT | Performed by: EMERGENCY MEDICINE

## 2022-01-20 PROCEDURE — 80053 COMPREHEN METABOLIC PANEL: CPT | Performed by: EMERGENCY MEDICINE

## 2022-01-20 PROCEDURE — 82077 ASSAY SPEC XCP UR&BREATH IA: CPT | Performed by: EMERGENCY MEDICINE

## 2022-01-20 PROCEDURE — 85610 PROTHROMBIN TIME: CPT | Performed by: EMERGENCY MEDICINE

## 2022-01-20 PROCEDURE — 36415 COLL VENOUS BLD VENIPUNCTURE: CPT

## 2022-01-20 PROCEDURE — 84484 ASSAY OF TROPONIN QUANT: CPT | Performed by: EMERGENCY MEDICINE

## 2022-01-20 PROCEDURE — 85007 BL SMEAR W/DIFF WBC COUNT: CPT | Performed by: EMERGENCY MEDICINE

## 2022-01-20 PROCEDURE — C9803 HOPD COVID-19 SPEC COLLECT: HCPCS | Performed by: EMERGENCY MEDICINE

## 2022-01-20 PROCEDURE — 70450 CT HEAD/BRAIN W/O DYE: CPT | Performed by: RADIOLOGY

## 2022-01-20 PROCEDURE — 99284 EMERGENCY DEPT VISIT MOD MDM: CPT

## 2022-01-20 PROCEDURE — 81003 URINALYSIS AUTO W/O SCOPE: CPT | Performed by: EMERGENCY MEDICINE

## 2022-01-20 PROCEDURE — 83880 ASSAY OF NATRIURETIC PEPTIDE: CPT | Performed by: EMERGENCY MEDICINE

## 2022-01-20 PROCEDURE — 85025 COMPLETE CBC W/AUTO DIFF WBC: CPT | Performed by: EMERGENCY MEDICINE

## 2022-01-20 PROCEDURE — 93005 ELECTROCARDIOGRAM TRACING: CPT | Performed by: EMERGENCY MEDICINE

## 2022-01-20 RX ORDER — HYDROCODONE BITARTRATE AND ACETAMINOPHEN 10; 325 MG/1; MG/1
1 TABLET ORAL ONCE
Status: COMPLETED | OUTPATIENT
Start: 2022-01-20 | End: 2022-01-20

## 2022-01-20 RX ORDER — KETOROLAC TROMETHAMINE 10 MG/1
10 TABLET, FILM COATED ORAL EVERY 6 HOURS PRN
Qty: 10 TABLET | Refills: 0 | Status: SHIPPED | OUTPATIENT
Start: 2022-01-20 | End: 2022-01-28 | Stop reason: ALTCHOICE

## 2022-01-20 RX ADMIN — HYDROCODONE BITARTRATE AND ACETAMINOPHEN 1 TABLET: 10; 325 TABLET ORAL at 17:27
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
## 2022-01-20 NOTE — PAYOR COMM NOTE
"[facility]  NPI:[id number]    Utilization Review  Contact: Keyona Vo RN  Phone: [phone]  Fax:[phone]    DISCHARGE NOTIFICATION    Girish Loja (51 y.o. Male)             Date of Birth Social Security Number Address Home Phone MRN    1970  [address]  [facility] [address] [phone] [id number]    Presybeterian Marital Status             Non-Anabaptism        Admission Date Admission Type Admitting Provider Attending Provider Department, Room/Bed    1/18/22 Emergency Henry Hawley MD  [facility], 3303/2S    Discharge Date Discharge Disposition Discharge Destination          1/19/2022 Home or Self Care              Attending Provider: (none)   Allergies: Penicillins    Isolation: None   Infection: None   Code Status: Prior   Advance Care Planning Activity    Ht: 167.6 cm (66\")   Wt: 89.3 kg (196 lb 12.8 oz)    Admission Cmt: None   Principal Problem: Ischemic optic neuropathy [H47.019]                 Active Insurance as of 1/18/2022     Primary Coverage     Payor Plan Insurance Group Employer/Plan Group    [employer] [place] [employer]      Payor Plan Address Payor Plan Phone Number Payor Plan Fax Number Effective Dates    [address] [address] [phone]  12/1/2016 - None Entered    [facility] [id number]       Subscriber Name Subscriber Birth Date Member ID       GIRISH LOJA 1970 [id number]                 Emergency Contacts      (Rel.) Home Phone Work Phone Mobile Phone    MYA LOJA (Significant Other) [phone] -- --                  "

## 2022-01-20 NOTE — PAYOR COMM NOTE
"[facility]  NPI:[id number]    Utilization Review  Contact: Keyona Vo RN  Phone: [phone]  Fax:[phone]      CLINICAL UPDATE      ATTENTION: SERGIO  Girish Bustamante (51 y.o. Male)             Date of Birth Social Security Number Address Home Phone MRN    1970  [address]  [place] [address] [phone] [id number]    Orthodoxy Marital Status             Non-Gnosticism        Admission Date Admission Type Admitting Provider Attending Provider Department, Room/Bed    1/18/22 Emergency Henry Hawley MD  [facility] 3 [facility], 3303/2S    Discharge Date Discharge Disposition Discharge Destination          1/19/2022 Home or Self Care              Attending Provider: (none)   Allergies: Penicillins    Isolation: None   Infection: None   Code Status: Prior   Advance Care Planning Activity    Ht: 167.6 cm (66\")   Wt: 89.3 kg (196 lb 12.8 oz)    Admission Cmt: None   Principal Problem: Ischemic optic neuropathy [H47.019]                 Active Insurance as of 1/18/2022     Primary Coverage     Payor Plan Insurance Group Employer/Plan Group    [employer] [place]ICA[place]      Payor Plan Address Payor Plan Phone Number Payor Plan Fax Number Effective Dates    [address] [address] [phone]  12/1/2016 - None Entered    [facility] [id number]       Subscriber Name Subscriber Birth Date Member ID       GIRISH BIRMINGHAM 1970 [id number]                 Emergency Contacts      (Rel.) Home Phone Work Phone Mobile Phone    MYA BIRMINGHAM (Significant Other) [phone] -- --          From admission, onward)        Start   Ordered   01/19/22 1551  Initiate Observation Status  Once        Transfer Service: Medicine       Question Answer Comment   Level of Care Telemetry    Diagnosis Vision loss of right eye    Admitting Physician HENRY HAWLEY    Attending Physician HENRY HAWLEY        01/19/22 1550          "

## 2022-01-20 NOTE — ANESTHESIA POSTPROCEDURE EVALUATION
Patient: Girish Loja    Procedure Summary     Date: 01/19/22 Room / Location: [facility] NONINVASIVE LAB    Anesthesia Start: 1232 Anesthesia Stop: 1257    Procedure: ADULT TRANSESOPHAGEAL ECHO (DAMIAN) W/ CONT IF NECESSARY PER PROTOCOL Diagnosis: (Cardiac Source of Emboli)    Scheduled Providers: Zechariah De Leon MD Provider: Fletcher Belle MD    Anesthesia Type: general ASA Status: 3          Anesthesia Type: general    Vitals  Vitals Value Taken Time   /100 01/19/22 1327   Temp 97.7 °F (36.5 °C) 01/19/22 1327   Pulse 90 01/19/22 1327   Resp 18 01/19/22 1327   SpO2 98 % 01/19/22 1327           Post Anesthesia Care and Evaluation    Patient location during evaluation: PHASE II  Patient participation: complete - patient participated  Level of consciousness: awake and alert  Pain score: 0  Pain management: adequate  Airway patency: patent  Anesthetic complications: No anesthetic complications    Cardiovascular status: acceptable  Respiratory status: acceptable  Hydration status: acceptable

## 2022-01-21 LAB
QT INTERVAL: 372 MS
QTC INTERVAL: 426 MS

## 2022-01-28 ENCOUNTER — OFFICE VISIT (OUTPATIENT)
Dept: NEUROLOGY | Facility: CLINIC | Age: 52
End: 2022-01-28

## 2022-01-28 VITALS
HEART RATE: 108 BPM | DIASTOLIC BLOOD PRESSURE: 70 MMHG | SYSTOLIC BLOOD PRESSURE: 110 MMHG | TEMPERATURE: 98.3 F | OXYGEN SATURATION: 99 % | BODY MASS INDEX: 28.19 KG/M2 | WEIGHT: 186 LBS | HEIGHT: 68 IN

## 2022-01-28 DIAGNOSIS — H47.011 ISCHEMIC OPTIC NEUROPATHY OF RIGHT EYE: Primary | ICD-10-CM

## 2022-01-28 DIAGNOSIS — R26.89 BALANCE DISORDER: ICD-10-CM

## 2022-01-28 PROCEDURE — 99214 OFFICE O/P EST MOD 30 MIN: CPT | Performed by: NURSE PRACTITIONER

## 2022-01-28 RX ORDER — AMLODIPINE BESYLATE 10 MG/1
10 TABLET ORAL DAILY
COMMUNITY
Start: 2022-01-25

## 2022-01-28 RX ORDER — VALSARTAN AND HYDROCHLOROTHIAZIDE 320; 12.5 MG/1; MG/1
TABLET, FILM COATED ORAL
COMMUNITY
Start: 2022-01-24

## 2022-01-28 RX ORDER — MECLIZINE HYDROCHLORIDE 25 MG/1
25 TABLET ORAL 3 TIMES DAILY PRN
COMMUNITY
Start: 2022-01-26

## 2022-01-28 RX ORDER — NITROGLYCERIN 0.4 MG/1
TABLET SUBLINGUAL
COMMUNITY
Start: 2022-01-27

## 2022-01-28 RX ORDER — ALPRAZOLAM 1 MG/1
1 TABLET ORAL 2 TIMES DAILY
COMMUNITY
Start: 2022-01-20
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
## 2022-02-02 ENCOUNTER — TRANSCRIBE ORDERS (OUTPATIENT)
Dept: ADMINISTRATIVE | Facility: HOSPITAL | Age: 52
End: 2022-02-02

## 2022-02-02 DIAGNOSIS — Z86.718 PERSONAL HISTORY OF VENOUS THROMBOSIS AND EMBOLISM: Primary | ICD-10-CM

## 2022-02-07 ENCOUNTER — TELEPHONE (OUTPATIENT)
Dept: NEUROLOGY | Facility: CLINIC | Age: 52
End: 2022-02-07

## 2022-02-07 NOTE — TELEPHONE ENCOUNTER
Caller: AVE     Best call back number: [phone]    What was the call regarding: PHYSICAL THEAP. . WANTED TO KNOW IF HE CAN START?     Do you require a callback: YES     PLEASE ADVISE.

## 2022-02-07 NOTE — TELEPHONE ENCOUNTER
SPOKE TO PATIENT AND HE WOULD LIKE TO GO TO [facility]. I HAVE FAXED THE REFERRAL AND THEY WILL CALL HIM TO SET UP EVALUATION.

## 2022-02-10 ENCOUNTER — OFFICE VISIT (OUTPATIENT)
Dept: CARDIOLOGY | Facility: CLINIC | Age: 52
End: 2022-02-10

## 2022-02-10 VITALS
SYSTOLIC BLOOD PRESSURE: 121 MMHG | WEIGHT: 197 LBS | HEART RATE: 91 BPM | HEIGHT: 68 IN | OXYGEN SATURATION: 98 % | BODY MASS INDEX: 29.86 KG/M2 | DIASTOLIC BLOOD PRESSURE: 77 MMHG

## 2022-02-10 DIAGNOSIS — R07.2 PRECORDIAL PAIN: Primary | ICD-10-CM

## 2022-02-10 DIAGNOSIS — Q21.12 PFO (PATENT FORAMEN OVALE): ICD-10-CM

## 2022-02-10 DIAGNOSIS — R00.2 PALPITATIONS: ICD-10-CM

## 2022-02-10 PROCEDURE — 99213 OFFICE O/P EST LOW 20 MIN: CPT | Performed by: NURSE PRACTITIONER

## 2022-02-10 RX ORDER — ASPIRIN 81 MG/1
81 TABLET ORAL DAILY
Qty: 30 TABLET | Refills: 11 | Status: SHIPPED | OUTPATIENT
Start: 2022-02-10 | End: 2022-03-03

## 2022-02-10 RX ORDER — PHENYTOIN SODIUM 100 MG/1
CAPSULE, EXTENDED RELEASE ORAL 2 TIMES DAILY
COMMUNITY

## 2022-02-10 NOTE — PROGRESS NOTES
"Chief Complaint  Hospital Follow Up Visit (patient states he still hasn't got his eye sight back, no feeling in his right feet /leg, no feeling in his right hand ), Chest Pain (patient states he is doing better now with his medicines ), and Shortness of Breath    Subjective          Girish Loja Jr. presents to [facility] for hospital follow-up.    History of Present Illness    Patient was admitted on 1/18/2022 and diagnosed with nonarteritic ischemic optic neuropathy of the right eye.  During his hospitalization he underwent transesophageal echocardiogram.  This revealed PFO.  Patient was discharged with neuro, ophthalmology, and cardiology follow-up.    Mr. Loja is accompanied by his wife for today's visit.  He reports that he is not back to his \"usual self\".  He reports that he still has decreased vision in his right eye.  He sees ophthalmology next week.    Mr. Loaj reports that he has had chest tightness since his discharge from the hospital.  He reports the epigastric area is \"sore to touch\".  He reports increased chest pain with inspiration.  He also reports that when he walks up the stairs he is short of breath and has tightness in his chest.      Objective     Vital Signs:   /77   Pulse 91   Ht 172.7 cm (68\")   Wt 89.4 kg (197 lb)   SpO2 98%   BMI 29.95 kg/m²       Physical Exam  Constitutional:       Appearance: Normal appearance. He is well-developed.   Cardiovascular:      Rate and Rhythm: Normal rate and regular rhythm.      Heart sounds: No murmur heard.  No friction rub. No gallop.    Pulmonary:      Effort: Pulmonary effort is normal. No respiratory distress.      Breath sounds: Normal breath sounds. No wheezing or rales.   Skin:     General: Skin is warm and dry.   Neurological:      Mental Status: He is alert and oriented to person, place, and time.   Psychiatric:         Mood and Affect: Mood normal.         Behavior: Behavior normal.          Result " Review :       Data reviewed: Cardiology studies Transesophageal echocardiogram     1/19/2022 transesophageal echocardiogram    Interpretation Summary    · Estimated left ventricular EF = 65% Left ventricular ejection fraction appears to be 61 - 65%. Left ventricular systolic function is normal.  · Normal RV size and function  · RA and LA are normal  · Aortic and mitral valves show normal structure and motion without significant MR  · LA appendage is narrow and normal  · Saline contrast study showed negative contrast suggesting Left to Right shuntin via PFO but no crossing of bubbles. There was no ASD and valsalva maneuver could not be performed due to heavy sedation  · Study compared to TTE of this admission and no change noted except finding of PFO with Left to right shunting.  · A second jet of blood flow noted in the right ventricle which may be due to atrio-ventricular inflow.      Current Outpatient Medications   Medication Sig Dispense Refill   • albuterol sulfate  (90 Base) MCG/ACT inhaler Inhale 2 puffs Every 4 (Four) Hours As Needed for Wheezing. 18 g 0   • ALPRAZolam (XANAX) 1 MG tablet Take 1 mg by mouth 2 (Two) Times a Day.     • amLODIPine (NORVASC) 10 MG tablet Take 10 mg by mouth Daily.     • aspirin EC 81 MG EC tablet Take 1 tablet by mouth Daily for 21 days. 30 tablet 11   • clopidogrel (PLAVIX) 75 MG tablet Take 1 tablet by mouth Daily. 21 tablet 0   • gabapentin (NEURONTIN) 600 MG tablet Take 600 mg by mouth 3 (Three) Times a Day.     • HYDROcodone-acetaminophen (NORCO)  MG per tablet Take 1 tablet by mouth Every 6 (Six) Hours As Needed for Moderate Pain .     • meclizine (ANTIVERT) 25 MG tablet Take 25 mg by mouth 3 (Three) Times a Day As Needed.     • nitroglycerin (NITROSTAT) 0.4 MG SL tablet      • pantoprazole (PROTONIX) 40 MG EC tablet Take 1 tablet by mouth Every Morning. 30 tablet 0   • phenytoin ER (DILANTIN) 100 MG capsule Take  by mouth 2 (Two) Times a Day.     •  pravastatin (Pravachol) 40 MG tablet Take 2 tablets by mouth Daily. 60 tablet 0   • valsartan-hydrochlorothiazide (DIOVAN-HCT) 320-12.5 MG per tablet        No current facility-administered medications for this visit.            Assessment and Plan    Problem List Items Addressed This Visit        Cardiac and Vasculature    PFO (patent foramen ovale)    Relevant Orders    Ambulatory Referral to Cardiology    Cardiac Event Monitor    Precordial pain - Primary    Relevant Orders    Stress Test With Myocardial Perfusion One Day      Other Visit Diagnoses     Palpitations        Relevant Orders    Cardiac Event Monitor              Follow Up     Medications were reviewed with the patient.  I have instructed him to decrease his aspirin to 81 mg daily.    Chest pain has typical and atypical features, nuclear stress test was ordered.    We also ordered an event monitor to be placed for palpitations and to rule out paroxysmal atrial fibrillation.    Referral to Dr. Hernandez regarding patient's PFO and ischemic optic neuropathy.    Return in about 4 weeks (around 3/10/2022).    Patient was given instructions and counseling regarding his condition or for health maintenance advice. Please see specific information pulled into the AVS if appropriate.

## 2022-02-11 NOTE — TELEPHONE ENCOUNTER
Caller: Girish Loja Jr.    Relationship: Self    Best call back number: [phone]    What was the call regarding: PT IS CALLING STATING HE CALLED  [facility] & WAS ABLE TO GET SCHEDULED FOR THIS COMING Monday, 2/14/22. PT STATES THAT THEIR OFFICE STATES THEY HAVE NOT YET RECEIVED HIS REFERRAL AND WILL NEED REFERRAL BEFORE Monday SO PT MAY COMPLETE HIS APPT.    PT STATES THEY PROVIDED AN ALTERNATE FAX #: [phone].    Do you require a callback: YES, PLEASE.    PLEASE REVIEW AND ADVISE.

## 2022-02-16 ENCOUNTER — HOSPITAL ENCOUNTER (OUTPATIENT)
Dept: CARDIOLOGY | Facility: HOSPITAL | Age: 52
Discharge: HOME | End: 2022-02-16
Admitting: INTERNAL MEDICINE

## 2022-02-16 DIAGNOSIS — Z86.718 PERSONAL HISTORY OF VENOUS THROMBOSIS AND EMBOLISM: ICD-10-CM

## 2022-02-16 PROCEDURE — 93970 EXTREMITY STUDY: CPT | Performed by: RADIOLOGY

## 2022-02-16 PROCEDURE — 93970 EXTREMITY STUDY: CPT

## 2022-02-18 ENCOUNTER — TRANSCRIBE ORDERS (OUTPATIENT)
Dept: PHYSICAL THERAPY | Facility: CLINIC | Age: 52
End: 2022-02-18

## 2022-02-18 DIAGNOSIS — H47.011 ISCHEMIC OPTIC NEUROPATHY OF RIGHT EYE: ICD-10-CM

## 2022-02-18 DIAGNOSIS — R26.89 BALANCE DISORDER: Primary | ICD-10-CM

## 2022-03-24 ENCOUNTER — TELEPHONE (OUTPATIENT)
Dept: CARDIOLOGY | Facility: CLINIC | Age: 52
End: 2022-03-24

## 2022-03-24 NOTE — TELEPHONE ENCOUNTER
PATIENT CALLED AND COMPLAINED OF CP AND CHEST TIGHTNESS, AND SWELLING IN CHEST MUSCLES AND FINGER TINGLING, ALSO PATIENT COMPLAINED THAT HE FELT LIKE HE HAD BLURRY VISION.   EXPRESSED TO THE PATIENT THE IMPORTANCE OF GOING TO THE ER TO BE CHECK OUT, BECAUSE THERE IS NOTHING WE COULD DO IN OFFICE FOR THIS CONDITION. HE STATED HE MAY GO TO THE [facility].

## 2022-06-15 ENCOUNTER — APPOINTMENT (OUTPATIENT)
Dept: CT IMAGING | Facility: HOSPITAL | Age: 52
End: 2022-06-15

## 2022-06-15 ENCOUNTER — HOSPITAL ENCOUNTER (EMERGENCY)
Facility: HOSPITAL | Age: 52
Discharge: HOME | End: 2022-06-16
Attending: STUDENT IN AN ORGANIZED HEALTH CARE EDUCATION/TRAINING PROGRAM | Admitting: STUDENT IN AN ORGANIZED HEALTH CARE EDUCATION/TRAINING PROGRAM

## 2022-06-15 DIAGNOSIS — S30.1XXA TRAUMATIC HEMATOMA OF ABDOMINAL WALL, INITIAL ENCOUNTER: Primary | ICD-10-CM

## 2022-06-15 LAB
ABO GROUP BLD: NORMAL
ALBUMIN SERPL-MCNC: 4.33 G/DL (ref 3.5–5.2)
ALBUMIN/GLOB SERPL: 1.2 G/DL
ALP SERPL-CCNC: 135 U/L (ref 39–117)
ALT SERPL W P-5'-P-CCNC: 50 U/L (ref 1–41)
ANION GAP SERPL CALCULATED.3IONS-SCNC: 16.5 MMOL/L (ref 5–15)
APTT PPP: 33.6 SECONDS (ref 26.5–34.5)
AST SERPL-CCNC: 29 U/L (ref 1–40)
BASOPHILS # BLD AUTO: 0.03 10*3/MM3 (ref 0–0.2)
BASOPHILS NFR BLD AUTO: 0.2 % (ref 0–1.5)
BILIRUB SERPL-MCNC: 0.3 MG/DL (ref 0–1.2)
BLD GP AB SCN SERPL QL: NEGATIVE
BUN SERPL-MCNC: 17 MG/DL (ref 6–20)
BUN/CREAT SERPL: 17.9 (ref 7–25)
CALCIUM SPEC-SCNC: 10.2 MG/DL (ref 8.6–10.5)
CHLORIDE SERPL-SCNC: 106 MMOL/L (ref 98–107)
CO2 SERPL-SCNC: 18.5 MMOL/L (ref 22–29)
CREAT SERPL-MCNC: 0.95 MG/DL (ref 0.76–1.27)
DEPRECATED RDW RBC AUTO: 43.9 FL (ref 37–54)
EGFRCR SERPLBLD CKD-EPI 2021: 97.5 ML/MIN/1.73
EOSINOPHIL # BLD AUTO: 0 10*3/MM3 (ref 0–0.4)
EOSINOPHIL NFR BLD AUTO: 0 % (ref 0.3–6.2)
ERYTHROCYTE [DISTWIDTH] IN BLOOD BY AUTOMATED COUNT: 13.2 % (ref 12.3–15.4)
FLUAV RNA RESP QL NAA+PROBE: NOT DETECTED
FLUBV RNA RESP QL NAA+PROBE: NOT DETECTED
GLOBULIN UR ELPH-MCNC: 3.7 GM/DL
GLUCOSE SERPL-MCNC: 199 MG/DL (ref 65–99)
HCT VFR BLD AUTO: 39.2 % (ref 37.5–51)
HGB BLD-MCNC: 13.1 G/DL (ref 13–17.7)
IMM GRANULOCYTES # BLD AUTO: 0.08 10*3/MM3 (ref 0–0.05)
IMM GRANULOCYTES NFR BLD AUTO: 0.4 % (ref 0–0.5)
INR PPP: 1.08 (ref 0.9–1.1)
LYMPHOCYTES # BLD AUTO: 0.85 10*3/MM3 (ref 0.7–3.1)
LYMPHOCYTES NFR BLD AUTO: 4.7 % (ref 19.6–45.3)
MCH RBC QN AUTO: 30.4 PG (ref 26.6–33)
MCHC RBC AUTO-ENTMCNC: 33.4 G/DL (ref 31.5–35.7)
MCV RBC AUTO: 91 FL (ref 79–97)
MONOCYTES # BLD AUTO: 0.16 10*3/MM3 (ref 0.1–0.9)
MONOCYTES NFR BLD AUTO: 0.9 % (ref 5–12)
NEUTROPHILS NFR BLD AUTO: 17.06 10*3/MM3 (ref 1.7–7)
NEUTROPHILS NFR BLD AUTO: 93.8 % (ref 42.7–76)
NRBC BLD AUTO-RTO: 0 /100 WBC (ref 0–0.2)
PLATELET # BLD AUTO: 282 10*3/MM3 (ref 140–450)
PMV BLD AUTO: 11.4 FL (ref 6–12)
POTASSIUM SERPL-SCNC: 3.9 MMOL/L (ref 3.5–5.2)
PROT SERPL-MCNC: 8 G/DL (ref 6–8.5)
PROTHROMBIN TIME: 14.2 SECONDS (ref 12.1–14.7)
RBC # BLD AUTO: 4.31 10*6/MM3 (ref 4.14–5.8)
RH BLD: NEGATIVE
SARS-COV-2 RNA RESP QL NAA+PROBE: NOT DETECTED
SODIUM SERPL-SCNC: 141 MMOL/L (ref 136–145)
T&S EXPIRATION DATE: NORMAL
TROPONIN T SERPL-MCNC: <0.01 NG/ML (ref 0–0.03)
WBC NRBC COR # BLD: 18.18 10*3/MM3 (ref 3.4–10.8)

## 2022-06-15 PROCEDURE — 25010000002 HYDROMORPHONE 1 MG/ML SOLUTION: Performed by: STUDENT IN AN ORGANIZED HEALTH CARE EDUCATION/TRAINING PROGRAM

## 2022-06-15 PROCEDURE — 99284 EMERGENCY DEPT VISIT MOD MDM: CPT

## 2022-06-15 PROCEDURE — 96374 THER/PROPH/DIAG INJ IV PUSH: CPT

## 2022-06-15 PROCEDURE — 85730 THROMBOPLASTIN TIME PARTIAL: CPT | Performed by: STUDENT IN AN ORGANIZED HEALTH CARE EDUCATION/TRAINING PROGRAM

## 2022-06-15 PROCEDURE — 87636 SARSCOV2 & INF A&B AMP PRB: CPT | Performed by: STUDENT IN AN ORGANIZED HEALTH CARE EDUCATION/TRAINING PROGRAM

## 2022-06-15 PROCEDURE — 84484 ASSAY OF TROPONIN QUANT: CPT | Performed by: STUDENT IN AN ORGANIZED HEALTH CARE EDUCATION/TRAINING PROGRAM

## 2022-06-15 PROCEDURE — 93010 ELECTROCARDIOGRAM REPORT: CPT | Performed by: INTERNAL MEDICINE

## 2022-06-15 PROCEDURE — 85025 COMPLETE CBC W/AUTO DIFF WBC: CPT | Performed by: STUDENT IN AN ORGANIZED HEALTH CARE EDUCATION/TRAINING PROGRAM

## 2022-06-15 PROCEDURE — 86901 BLOOD TYPING SEROLOGIC RH(D): CPT | Performed by: STUDENT IN AN ORGANIZED HEALTH CARE EDUCATION/TRAINING PROGRAM

## 2022-06-15 PROCEDURE — 71275 CT ANGIOGRAPHY CHEST: CPT

## 2022-06-15 PROCEDURE — 80053 COMPREHEN METABOLIC PANEL: CPT | Performed by: STUDENT IN AN ORGANIZED HEALTH CARE EDUCATION/TRAINING PROGRAM

## 2022-06-15 PROCEDURE — 93005 ELECTROCARDIOGRAM TRACING: CPT | Performed by: STUDENT IN AN ORGANIZED HEALTH CARE EDUCATION/TRAINING PROGRAM

## 2022-06-15 PROCEDURE — 86900 BLOOD TYPING SEROLOGIC ABO: CPT | Performed by: STUDENT IN AN ORGANIZED HEALTH CARE EDUCATION/TRAINING PROGRAM

## 2022-06-15 PROCEDURE — 74177 CT ABD & PELVIS W/CONTRAST: CPT

## 2022-06-15 PROCEDURE — 85610 PROTHROMBIN TIME: CPT | Performed by: STUDENT IN AN ORGANIZED HEALTH CARE EDUCATION/TRAINING PROGRAM

## 2022-06-15 PROCEDURE — 86850 RBC ANTIBODY SCREEN: CPT | Performed by: STUDENT IN AN ORGANIZED HEALTH CARE EDUCATION/TRAINING PROGRAM

## 2022-06-15 RX ORDER — SODIUM CHLORIDE 0.9 % (FLUSH) 0.9 %
10 SYRINGE (ML) INJECTION AS NEEDED
Status: DISCONTINUED | OUTPATIENT
Start: 2022-06-15 | End: 2022-06-16 | Stop reason: HOSPADM

## 2022-06-15 RX ADMIN — HYDROMORPHONE HYDROCHLORIDE 1 MG: 1 INJECTION, SOLUTION INTRAMUSCULAR; INTRAVENOUS; SUBCUTANEOUS at 23:30

## 2022-06-16 VITALS
BODY MASS INDEX: 26.03 KG/M2 | DIASTOLIC BLOOD PRESSURE: 74 MMHG | TEMPERATURE: 98 F | OXYGEN SATURATION: 94 % | WEIGHT: 162 LBS | HEIGHT: 66 IN | HEART RATE: 89 BPM | SYSTOLIC BLOOD PRESSURE: 130 MMHG | RESPIRATION RATE: 20 BRPM

## 2022-06-16 LAB
BACTERIA UR QL AUTO: ABNORMAL /HPF
BILIRUB UR QL STRIP: NEGATIVE
CLARITY UR: CLEAR
COLOR UR: ABNORMAL
GLUCOSE UR STRIP-MCNC: ABNORMAL MG/DL
HGB UR QL STRIP.AUTO: NEGATIVE
HOLD SPECIMEN: NORMAL
HOLD SPECIMEN: NORMAL
HYALINE CASTS UR QL AUTO: ABNORMAL /LPF
KETONES UR QL STRIP: ABNORMAL
LEUKOCYTE ESTERASE UR QL STRIP.AUTO: NEGATIVE
NITRITE UR QL STRIP: NEGATIVE
PH UR STRIP.AUTO: 5.5 [PH] (ref 5–8)
PROT UR QL STRIP: ABNORMAL
RBC # UR STRIP: ABNORMAL /HPF
REF LAB TEST METHOD: ABNORMAL
SP GR UR STRIP: >1.03 (ref 1–1.03)
SQUAMOUS #/AREA URNS HPF: ABNORMAL /HPF
TROPONIN T SERPL-MCNC: <0.01 NG/ML (ref 0–0.03)
UROBILINOGEN UR QL STRIP: ABNORMAL
WBC # UR STRIP: ABNORMAL /HPF
WHOLE BLOOD HOLD COAG: NORMAL
WHOLE BLOOD HOLD SPECIMEN: NORMAL

## 2022-06-16 PROCEDURE — 25010000002 IOPAMIDOL 61 % SOLUTION: Performed by: STUDENT IN AN ORGANIZED HEALTH CARE EDUCATION/TRAINING PROGRAM

## 2022-06-16 PROCEDURE — 36415 COLL VENOUS BLD VENIPUNCTURE: CPT

## 2022-06-16 PROCEDURE — 84484 ASSAY OF TROPONIN QUANT: CPT | Performed by: STUDENT IN AN ORGANIZED HEALTH CARE EDUCATION/TRAINING PROGRAM

## 2022-06-16 PROCEDURE — 81001 URINALYSIS AUTO W/SCOPE: CPT | Performed by: STUDENT IN AN ORGANIZED HEALTH CARE EDUCATION/TRAINING PROGRAM

## 2022-06-16 RX ADMIN — IOPAMIDOL 100 ML: 612 INJECTION, SOLUTION INTRAVENOUS at 00:06
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
## 2022-06-16 NOTE — ED NOTES
Attempted to bladder scan, bladder not detected on scanner. Pt states he feels like he needs to void, urinal provided, pt unable to void on his own.

## 2022-06-16 NOTE — DISCHARGE INSTRUCTIONS
"CT imaging shows large bruises but no life-threatening internal damage.  Also incidental finding \"Calcified granuloma is present in the right upper lobe.\"  Recommend he follow-up with your primary care provider regarding this finding as you may need repeat imaging to evaluate changes.  Recommend ibuprofen and Tylenol for pain.  Do not hesitate to return for new onset or worsening symptoms.  Otherwise follow-up with your primary care provider in the next few days as needed.  "
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
## 2022-06-17 LAB
QT INTERVAL: 340 MS
QTC INTERVAL: 436 MS

## 2022-12-27 ENCOUNTER — APPOINTMENT (OUTPATIENT)
Dept: CT IMAGING | Facility: HOSPITAL | Age: 52
End: 2022-12-27
Payer: COMMERCIAL

## 2022-12-27 ENCOUNTER — HOSPITAL ENCOUNTER (EMERGENCY)
Facility: HOSPITAL | Age: 52
Discharge: HOME | End: 2022-12-27
Attending: STUDENT IN AN ORGANIZED HEALTH CARE EDUCATION/TRAINING PROGRAM | Admitting: STUDENT IN AN ORGANIZED HEALTH CARE EDUCATION/TRAINING PROGRAM
Payer: COMMERCIAL

## 2022-12-27 ENCOUNTER — APPOINTMENT (OUTPATIENT)
Dept: GENERAL RADIOLOGY | Facility: HOSPITAL | Age: 52
End: 2022-12-27
Payer: COMMERCIAL

## 2022-12-27 VITALS
OXYGEN SATURATION: 93 % | HEIGHT: 66 IN | RESPIRATION RATE: 20 BRPM | TEMPERATURE: 98.2 F | BODY MASS INDEX: 26.03 KG/M2 | SYSTOLIC BLOOD PRESSURE: 123 MMHG | WEIGHT: 162 LBS | DIASTOLIC BLOOD PRESSURE: 92 MMHG | HEART RATE: 93 BPM

## 2022-12-27 DIAGNOSIS — F44.5 PSEUDOSEIZURE: ICD-10-CM

## 2022-12-27 DIAGNOSIS — F44.5 PSYCHOGENIC NONEPILEPTIC SEIZURE: Primary | ICD-10-CM

## 2022-12-27 LAB
ALBUMIN SERPL-MCNC: 4.1 G/DL (ref 3.5–5.2)
ALBUMIN/GLOB SERPL: 1.2 G/DL
ALP SERPL-CCNC: 86 U/L (ref 39–117)
ALT SERPL W P-5'-P-CCNC: 58 U/L (ref 1–41)
ANION GAP SERPL CALCULATED.3IONS-SCNC: 12.9 MMOL/L (ref 5–15)
APTT PPP: 29.1 SECONDS (ref 26.5–34.5)
AST SERPL-CCNC: 30 U/L (ref 1–40)
BASOPHILS # BLD AUTO: 0.05 10*3/MM3 (ref 0–0.2)
BASOPHILS NFR BLD AUTO: 0.4 % (ref 0–1.5)
BILIRUB SERPL-MCNC: 0.2 MG/DL (ref 0–1.2)
BUN SERPL-MCNC: 12 MG/DL (ref 6–20)
BUN/CREAT SERPL: 14 (ref 7–25)
CALCIUM SPEC-SCNC: 9.7 MG/DL (ref 8.6–10.5)
CHLORIDE SERPL-SCNC: 102 MMOL/L (ref 98–107)
CO2 SERPL-SCNC: 23.1 MMOL/L (ref 22–29)
CREAT SERPL-MCNC: 0.86 MG/DL (ref 0.76–1.27)
CRP SERPL-MCNC: 1.72 MG/DL (ref 0–0.5)
D-LACTATE SERPL-SCNC: 1.9 MMOL/L (ref 0.5–2)
DEPRECATED RDW RBC AUTO: 46.5 FL (ref 37–54)
EGFRCR SERPLBLD CKD-EPI 2021: 105.5 ML/MIN/1.73
EOSINOPHIL # BLD AUTO: 0.27 10*3/MM3 (ref 0–0.4)
EOSINOPHIL NFR BLD AUTO: 2.2 % (ref 0.3–6.2)
ERYTHROCYTE [DISTWIDTH] IN BLOOD BY AUTOMATED COUNT: 13.7 % (ref 12.3–15.4)
ETHANOL BLD-MCNC: <10 MG/DL (ref 0–10)
ETHANOL UR QL: <0.01 %
FLUAV RNA RESP QL NAA+PROBE: NOT DETECTED
FLUBV RNA RESP QL NAA+PROBE: NOT DETECTED
GLOBULIN UR ELPH-MCNC: 3.4 GM/DL
GLUCOSE BLDC GLUCOMTR-MCNC: 101 MG/DL (ref 70–130)
GLUCOSE SERPL-MCNC: 104 MG/DL (ref 65–99)
HCT VFR BLD AUTO: 41.8 % (ref 37.5–51)
HGB BLD-MCNC: 13.9 G/DL (ref 13–17.7)
IMM GRANULOCYTES # BLD AUTO: 0.04 10*3/MM3 (ref 0–0.05)
IMM GRANULOCYTES NFR BLD AUTO: 0.3 % (ref 0–0.5)
INR PPP: 0.96 (ref 0.9–1.1)
LYMPHOCYTES # BLD AUTO: 3.69 10*3/MM3 (ref 0.7–3.1)
LYMPHOCYTES NFR BLD AUTO: 30.7 % (ref 19.6–45.3)
MAGNESIUM SERPL-MCNC: 2.1 MG/DL (ref 1.6–2.6)
MCH RBC QN AUTO: 30.6 PG (ref 26.6–33)
MCHC RBC AUTO-ENTMCNC: 33.3 G/DL (ref 31.5–35.7)
MCV RBC AUTO: 92.1 FL (ref 79–97)
MONOCYTES # BLD AUTO: 0.68 10*3/MM3 (ref 0.1–0.9)
MONOCYTES NFR BLD AUTO: 5.7 % (ref 5–12)
NEUTROPHILS NFR BLD AUTO: 60.7 % (ref 42.7–76)
NEUTROPHILS NFR BLD AUTO: 7.3 10*3/MM3 (ref 1.7–7)
NRBC BLD AUTO-RTO: 0 /100 WBC (ref 0–0.2)
PHENYTOIN SERPL-MCNC: 4.6 MCG/ML (ref 10–20)
PHOSPHATE SERPL-MCNC: 4.4 MG/DL (ref 2.5–4.5)
PLATELET # BLD AUTO: 233 10*3/MM3 (ref 140–450)
PMV BLD AUTO: 11 FL (ref 6–12)
POTASSIUM SERPL-SCNC: 4.1 MMOL/L (ref 3.5–5.2)
PROCALCITONIN SERPL-MCNC: 0.03 NG/ML (ref 0–0.25)
PROT SERPL-MCNC: 7.5 G/DL (ref 6–8.5)
PROTHROMBIN TIME: 13 SECONDS (ref 12.1–14.7)
QT INTERVAL: 344 MS
QTC INTERVAL: 460 MS
RBC # BLD AUTO: 4.54 10*6/MM3 (ref 4.14–5.8)
SARS-COV-2 RNA RESP QL NAA+PROBE: NOT DETECTED
SODIUM SERPL-SCNC: 138 MMOL/L (ref 136–145)
TSH SERPL DL<=0.05 MIU/L-ACNC: 1.61 UIU/ML (ref 0.27–4.2)
WBC NRBC COR # BLD: 12.03 10*3/MM3 (ref 3.4–10.8)

## 2022-12-27 PROCEDURE — 87636 SARSCOV2 & INF A&B AMP PRB: CPT | Performed by: STUDENT IN AN ORGANIZED HEALTH CARE EDUCATION/TRAINING PROGRAM

## 2022-12-27 PROCEDURE — 96365 THER/PROPH/DIAG IV INF INIT: CPT

## 2022-12-27 PROCEDURE — 82962 GLUCOSE BLOOD TEST: CPT

## 2022-12-27 PROCEDURE — 85730 THROMBOPLASTIN TIME PARTIAL: CPT | Performed by: STUDENT IN AN ORGANIZED HEALTH CARE EDUCATION/TRAINING PROGRAM

## 2022-12-27 PROCEDURE — 70450 CT HEAD/BRAIN W/O DYE: CPT

## 2022-12-27 PROCEDURE — 80050 GENERAL HEALTH PANEL: CPT | Performed by: STUDENT IN AN ORGANIZED HEALTH CARE EDUCATION/TRAINING PROGRAM

## 2022-12-27 PROCEDURE — 96375 TX/PRO/DX INJ NEW DRUG ADDON: CPT

## 2022-12-27 PROCEDURE — 71045 X-RAY EXAM CHEST 1 VIEW: CPT

## 2022-12-27 PROCEDURE — 87040 BLOOD CULTURE FOR BACTERIA: CPT | Performed by: STUDENT IN AN ORGANIZED HEALTH CARE EDUCATION/TRAINING PROGRAM

## 2022-12-27 PROCEDURE — 86140 C-REACTIVE PROTEIN: CPT | Performed by: STUDENT IN AN ORGANIZED HEALTH CARE EDUCATION/TRAINING PROGRAM

## 2022-12-27 PROCEDURE — 0 LEVETIRACETAM IN NACL 0.54% 1500 MG/100ML SOLUTION: Performed by: STUDENT IN AN ORGANIZED HEALTH CARE EDUCATION/TRAINING PROGRAM

## 2022-12-27 PROCEDURE — 0 PHENYTOIN PER 50 MG: Performed by: STUDENT IN AN ORGANIZED HEALTH CARE EDUCATION/TRAINING PROGRAM

## 2022-12-27 PROCEDURE — 85610 PROTHROMBIN TIME: CPT | Performed by: STUDENT IN AN ORGANIZED HEALTH CARE EDUCATION/TRAINING PROGRAM

## 2022-12-27 PROCEDURE — 25010000002 LORAZEPAM PER 2 MG: Performed by: STUDENT IN AN ORGANIZED HEALTH CARE EDUCATION/TRAINING PROGRAM

## 2022-12-27 PROCEDURE — 25010000002 KETOROLAC TROMETHAMINE PER 15 MG: Performed by: STUDENT IN AN ORGANIZED HEALTH CARE EDUCATION/TRAINING PROGRAM

## 2022-12-27 PROCEDURE — 93005 ELECTROCARDIOGRAM TRACING: CPT | Performed by: STUDENT IN AN ORGANIZED HEALTH CARE EDUCATION/TRAINING PROGRAM

## 2022-12-27 PROCEDURE — 83735 ASSAY OF MAGNESIUM: CPT | Performed by: STUDENT IN AN ORGANIZED HEALTH CARE EDUCATION/TRAINING PROGRAM

## 2022-12-27 PROCEDURE — 99284 EMERGENCY DEPT VISIT MOD MDM: CPT

## 2022-12-27 PROCEDURE — 80185 ASSAY OF PHENYTOIN TOTAL: CPT | Performed by: STUDENT IN AN ORGANIZED HEALTH CARE EDUCATION/TRAINING PROGRAM

## 2022-12-27 PROCEDURE — 83605 ASSAY OF LACTIC ACID: CPT | Performed by: STUDENT IN AN ORGANIZED HEALTH CARE EDUCATION/TRAINING PROGRAM

## 2022-12-27 PROCEDURE — 84100 ASSAY OF PHOSPHORUS: CPT | Performed by: STUDENT IN AN ORGANIZED HEALTH CARE EDUCATION/TRAINING PROGRAM

## 2022-12-27 PROCEDURE — 25010000002 PROCHLORPERAZINE 10 MG/2ML SOLUTION: Performed by: STUDENT IN AN ORGANIZED HEALTH CARE EDUCATION/TRAINING PROGRAM

## 2022-12-27 PROCEDURE — 36415 COLL VENOUS BLD VENIPUNCTURE: CPT

## 2022-12-27 PROCEDURE — 82077 ASSAY SPEC XCP UR&BREATH IA: CPT | Performed by: STUDENT IN AN ORGANIZED HEALTH CARE EDUCATION/TRAINING PROGRAM

## 2022-12-27 PROCEDURE — 84145 PROCALCITONIN (PCT): CPT | Performed by: STUDENT IN AN ORGANIZED HEALTH CARE EDUCATION/TRAINING PROGRAM

## 2022-12-27 RX ORDER — LEVETIRACETAM 15 MG/ML
1500 INJECTION INTRAVASCULAR ONCE
Status: COMPLETED | OUTPATIENT
Start: 2022-12-27 | End: 2022-12-27

## 2022-12-27 RX ORDER — LORAZEPAM 2 MG/ML
2 INJECTION INTRAMUSCULAR ONCE AS NEEDED
Status: DISCONTINUED | OUTPATIENT
Start: 2022-12-27 | End: 2022-12-28 | Stop reason: HOSPADM

## 2022-12-27 RX ORDER — PHENYTOIN SODIUM 50 MG/ML
10 INJECTION, SOLUTION INTRAMUSCULAR; INTRAVENOUS ONCE
Status: DISCONTINUED | OUTPATIENT
Start: 2022-12-27 | End: 2022-12-27

## 2022-12-27 RX ORDER — LORAZEPAM 2 MG/ML
2 INJECTION INTRAMUSCULAR ONCE AS NEEDED
Status: COMPLETED | OUTPATIENT
Start: 2022-12-27 | End: 2022-12-27

## 2022-12-27 RX ORDER — PROCHLORPERAZINE EDISYLATE 5 MG/ML
5 INJECTION INTRAMUSCULAR; INTRAVENOUS ONCE
Status: COMPLETED | OUTPATIENT
Start: 2022-12-27 | End: 2022-12-27

## 2022-12-27 RX ORDER — PHENYTOIN SODIUM 50 MG/ML
5 INJECTION, SOLUTION INTRAMUSCULAR; INTRAVENOUS ONCE
Status: DISCONTINUED | OUTPATIENT
Start: 2022-12-27 | End: 2022-12-27

## 2022-12-27 RX ORDER — KETOROLAC TROMETHAMINE 30 MG/ML
15 INJECTION, SOLUTION INTRAMUSCULAR; INTRAVENOUS ONCE
Status: COMPLETED | OUTPATIENT
Start: 2022-12-27 | End: 2022-12-27

## 2022-12-27 RX ADMIN — LEVETIRACETAM 1500 MG: 15 INJECTION INTRAVENOUS at 20:17

## 2022-12-27 RX ADMIN — KETOROLAC TROMETHAMINE 15 MG: 30 INJECTION, SOLUTION INTRAMUSCULAR; INTRAVENOUS at 21:48

## 2022-12-27 RX ADMIN — PROCHLORPERAZINE EDISYLATE 5 MG: 5 INJECTION INTRAMUSCULAR; INTRAVENOUS at 21:48

## 2022-12-27 RX ADMIN — SODIUM CHLORIDE 1000 ML: 9 INJECTION, SOLUTION INTRAVENOUS at 20:16

## 2022-12-27 RX ADMIN — LORAZEPAM 2 MG: 2 INJECTION INTRAMUSCULAR; INTRAVENOUS at 20:16

## 2022-12-27 RX ADMIN — PHENYTOIN SODIUM 735 MG: 50 INJECTION INTRAMUSCULAR; INTRAVENOUS at 22:15

## 2022-12-28 NOTE — DISCHARGE INSTRUCTIONS
Take medications as prescribed.  Follow-up with your family practitioner and neurologist as soon as possible.  Also recommend follow-up with psychiatry soon as possible.  Do not hesitate to call 911 or return here for new onset or worsening symptoms.
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
## 2023-01-01 LAB
BACTERIA SPEC AEROBE CULT: NORMAL
BACTERIA SPEC AEROBE CULT: NORMAL

## 2024-02-04 ENCOUNTER — HOSPITAL ENCOUNTER (OUTPATIENT)
Facility: HOSPITAL | Age: 54
Discharge: HOME | End: 2024-02-05
Attending: INTERNAL MEDICINE | Admitting: INTERNAL MEDICINE
Payer: COMMERCIAL

## 2024-02-04 DIAGNOSIS — J44.9 CHRONIC OBSTRUCTIVE PULMONARY DISEASE, UNSPECIFIED COPD TYPE: ICD-10-CM

## 2024-02-04 DIAGNOSIS — R07.89 ATYPICAL CHEST PAIN: Primary | ICD-10-CM

## 2024-02-04 DIAGNOSIS — J44.1 CHRONIC OBSTRUCTIVE PULMONARY DISEASE WITH ACUTE EXACERBATION: ICD-10-CM

## 2024-02-04 PROBLEM — I25.110 CORONARY ARTERY DISEASE INVOLVING NATIVE HEART WITH UNSTABLE ANGINA PECTORIS: Status: ACTIVE | Noted: 2024-02-04

## 2024-02-04 PROBLEM — I20.0 UNSTABLE ANGINA: Status: ACTIVE | Noted: 2024-02-04

## 2024-02-04 PROBLEM — Z72.0 TOBACCO USE: Status: ACTIVE | Noted: 2024-02-04

## 2024-02-04 LAB
ANION GAP SERPL CALCULATED.3IONS-SCNC: 10.5 MMOL/L (ref 5–15)
BUN SERPL-MCNC: 13 MG/DL (ref 6–20)
BUN/CREAT SERPL: 23.2 (ref 7–25)
CALCIUM SPEC-SCNC: 8.8 MG/DL (ref 8.6–10.5)
CHLORIDE SERPL-SCNC: 102 MMOL/L (ref 98–107)
CO2 SERPL-SCNC: 24.5 MMOL/L (ref 22–29)
CREAT SERPL-MCNC: 0.56 MG/DL (ref 0.76–1.27)
EGFRCR SERPLBLD CKD-EPI 2021: 119.3 ML/MIN/1.73
GEN 5 2HR TROPONIN T REFLEX: 10 NG/L
GLUCOSE SERPL-MCNC: 98 MG/DL (ref 65–99)
POTASSIUM SERPL-SCNC: 3.8 MMOL/L (ref 3.5–5.2)
SODIUM SERPL-SCNC: 137 MMOL/L (ref 136–145)
TROPONIN T DELTA: 1 NG/L
TROPONIN T SERPL HS-MCNC: 9 NG/L

## 2024-02-04 PROCEDURE — 96372 THER/PROPH/DIAG INJ SC/IM: CPT

## 2024-02-04 PROCEDURE — 94640 AIRWAY INHALATION TREATMENT: CPT

## 2024-02-04 PROCEDURE — 96374 THER/PROPH/DIAG INJ IV PUSH: CPT

## 2024-02-04 PROCEDURE — G0379 DIRECT REFER HOSPITAL OBSERV: HCPCS

## 2024-02-04 PROCEDURE — G0378 HOSPITAL OBSERVATION PER HR: HCPCS

## 2024-02-04 PROCEDURE — 93010 ELECTROCARDIOGRAM REPORT: CPT | Performed by: INTERNAL MEDICINE

## 2024-02-04 PROCEDURE — 99223 1ST HOSP IP/OBS HIGH 75: CPT | Performed by: INTERNAL MEDICINE

## 2024-02-04 PROCEDURE — 84484 ASSAY OF TROPONIN QUANT: CPT | Performed by: INTERNAL MEDICINE

## 2024-02-04 PROCEDURE — 94799 UNLISTED PULMONARY SVC/PX: CPT

## 2024-02-04 PROCEDURE — 25010000002 MORPHINE PER 10 MG: Performed by: INTERNAL MEDICINE

## 2024-02-04 PROCEDURE — 25010000002 ENOXAPARIN PER 10 MG: Performed by: INTERNAL MEDICINE

## 2024-02-04 PROCEDURE — 93005 ELECTROCARDIOGRAM TRACING: CPT | Performed by: INTERNAL MEDICINE

## 2024-02-04 PROCEDURE — 80048 BASIC METABOLIC PNL TOTAL CA: CPT | Performed by: INTERNAL MEDICINE

## 2024-02-04 RX ORDER — HYDROCODONE BITARTRATE AND ACETAMINOPHEN 10; 325 MG/1; MG/1
1 TABLET ORAL EVERY 6 HOURS PRN
Status: DISCONTINUED | OUTPATIENT
Start: 2024-02-04 | End: 2024-02-05 | Stop reason: HOSPADM

## 2024-02-04 RX ORDER — NICOTINE 21 MG/24HR
1 PATCH, TRANSDERMAL 24 HOURS TRANSDERMAL EVERY 24 HOURS
Status: DISCONTINUED | OUTPATIENT
Start: 2024-02-04 | End: 2024-02-05 | Stop reason: HOSPADM

## 2024-02-04 RX ORDER — NITROGLYCERIN 0.4 MG/1
0.4 TABLET SUBLINGUAL
Status: DISCONTINUED | OUTPATIENT
Start: 2024-02-04 | End: 2024-02-04 | Stop reason: SDUPTHER

## 2024-02-04 RX ORDER — BISACODYL 10 MG
10 SUPPOSITORY, RECTAL RECTAL DAILY PRN
Status: DISCONTINUED | OUTPATIENT
Start: 2024-02-04 | End: 2024-02-05 | Stop reason: HOSPADM

## 2024-02-04 RX ORDER — ALBUTEROL SULFATE 2.5 MG/3ML
2.5 SOLUTION RESPIRATORY (INHALATION)
Status: DISCONTINUED | OUTPATIENT
Start: 2024-02-04 | End: 2024-02-05

## 2024-02-04 RX ORDER — NICOTINE 21 MG/24HR
1 PATCH, TRANSDERMAL 24 HOURS TRANSDERMAL EVERY 24 HOURS
Status: DISCONTINUED | OUTPATIENT
Start: 2024-02-04 | End: 2024-02-04

## 2024-02-04 RX ORDER — AMLODIPINE BESYLATE 5 MG/1
10 TABLET ORAL DAILY
Status: DISCONTINUED | OUTPATIENT
Start: 2024-02-04 | End: 2024-02-05 | Stop reason: HOSPADM

## 2024-02-04 RX ORDER — ONDANSETRON 2 MG/ML
4 INJECTION INTRAMUSCULAR; INTRAVENOUS EVERY 6 HOURS PRN
Status: DISCONTINUED | OUTPATIENT
Start: 2024-02-04 | End: 2024-02-05 | Stop reason: HOSPADM

## 2024-02-04 RX ORDER — AMOXICILLIN 250 MG
2 CAPSULE ORAL 2 TIMES DAILY
Status: DISCONTINUED | OUTPATIENT
Start: 2024-02-04 | End: 2024-02-05 | Stop reason: HOSPADM

## 2024-02-04 RX ORDER — BISACODYL 5 MG/1
5 TABLET, DELAYED RELEASE ORAL DAILY PRN
Status: DISCONTINUED | OUTPATIENT
Start: 2024-02-04 | End: 2024-02-05 | Stop reason: HOSPADM

## 2024-02-04 RX ORDER — CLOPIDOGREL BISULFATE 75 MG/1
75 TABLET ORAL DAILY
Status: DISCONTINUED | OUTPATIENT
Start: 2024-02-04 | End: 2024-02-05 | Stop reason: HOSPADM

## 2024-02-04 RX ORDER — CARVEDILOL 3.12 MG/1
3.12 TABLET ORAL ONCE
Status: COMPLETED | OUTPATIENT
Start: 2024-02-04 | End: 2024-02-04

## 2024-02-04 RX ORDER — HYDROCHLOROTHIAZIDE 12.5 MG/1
12.5 TABLET ORAL
Status: DISCONTINUED | OUTPATIENT
Start: 2024-02-04 | End: 2024-02-05 | Stop reason: HOSPADM

## 2024-02-04 RX ORDER — ENOXAPARIN SODIUM 100 MG/ML
1 INJECTION SUBCUTANEOUS EVERY 12 HOURS
Status: DISCONTINUED | OUTPATIENT
Start: 2024-02-04 | End: 2024-02-05

## 2024-02-04 RX ORDER — BUDESONIDE 0.5 MG/2ML
1 INHALANT ORAL
Status: DISCONTINUED | OUTPATIENT
Start: 2024-02-04 | End: 2024-02-05

## 2024-02-04 RX ORDER — ATORVASTATIN CALCIUM 80 MG/1
80 TABLET, FILM COATED ORAL NIGHTLY
Status: DISCONTINUED | OUTPATIENT
Start: 2024-02-04 | End: 2024-02-05 | Stop reason: HOSPADM

## 2024-02-04 RX ORDER — PHENYTOIN SODIUM 100 MG/1
100 CAPSULE, EXTENDED RELEASE ORAL 2 TIMES DAILY
Status: DISCONTINUED | OUTPATIENT
Start: 2024-02-04 | End: 2024-02-05

## 2024-02-04 RX ORDER — VALSARTAN 80 MG/1
320 TABLET ORAL
Status: DISCONTINUED | OUTPATIENT
Start: 2024-02-04 | End: 2024-02-05 | Stop reason: HOSPADM

## 2024-02-04 RX ORDER — POLYETHYLENE GLYCOL 3350 17 G/17G
17 POWDER, FOR SOLUTION ORAL DAILY PRN
Status: DISCONTINUED | OUTPATIENT
Start: 2024-02-04 | End: 2024-02-04 | Stop reason: SDUPTHER

## 2024-02-04 RX ORDER — CHOLECALCIFEROL (VITAMIN D3) 125 MCG
5 CAPSULE ORAL NIGHTLY
Status: DISCONTINUED | OUTPATIENT
Start: 2024-02-04 | End: 2024-02-05 | Stop reason: HOSPADM

## 2024-02-04 RX ORDER — GABAPENTIN 400 MG/1
800 CAPSULE ORAL EVERY 8 HOURS SCHEDULED
Status: DISCONTINUED | OUTPATIENT
Start: 2024-02-04 | End: 2024-02-05 | Stop reason: HOSPADM

## 2024-02-04 RX ORDER — ASPIRIN 81 MG/1
81 TABLET ORAL DAILY
Status: DISCONTINUED | OUTPATIENT
Start: 2024-02-04 | End: 2024-02-05 | Stop reason: HOSPADM

## 2024-02-04 RX ORDER — BISACODYL 5 MG/1
5 TABLET, DELAYED RELEASE ORAL DAILY PRN
Status: DISCONTINUED | OUTPATIENT
Start: 2024-02-04 | End: 2024-02-04 | Stop reason: SDUPTHER

## 2024-02-04 RX ORDER — CARVEDILOL 3.12 MG/1
3.12 TABLET ORAL 2 TIMES DAILY WITH MEALS
Status: DISCONTINUED | OUTPATIENT
Start: 2024-02-04 | End: 2024-02-04

## 2024-02-04 RX ORDER — ONDANSETRON 4 MG/1
4 TABLET, ORALLY DISINTEGRATING ORAL EVERY 6 HOURS PRN
Status: DISCONTINUED | OUTPATIENT
Start: 2024-02-04 | End: 2024-02-05 | Stop reason: HOSPADM

## 2024-02-04 RX ORDER — ACETAMINOPHEN 325 MG/1
650 TABLET ORAL EVERY 4 HOURS PRN
Status: DISCONTINUED | OUTPATIENT
Start: 2024-02-04 | End: 2024-02-05 | Stop reason: HOSPADM

## 2024-02-04 RX ORDER — CLOPIDOGREL BISULFATE 75 MG/1
75 TABLET ORAL DAILY
Status: DISCONTINUED | OUTPATIENT
Start: 2024-02-04 | End: 2024-02-04 | Stop reason: SDUPTHER

## 2024-02-04 RX ORDER — MECLIZINE HYDROCHLORIDE 25 MG/1
25 TABLET ORAL 3 TIMES DAILY PRN
Status: DISCONTINUED | OUTPATIENT
Start: 2024-02-04 | End: 2024-02-05 | Stop reason: HOSPADM

## 2024-02-04 RX ORDER — MORPHINE SULFATE 2 MG/ML
2 INJECTION, SOLUTION INTRAMUSCULAR; INTRAVENOUS EVERY 4 HOURS PRN
Status: DISCONTINUED | OUTPATIENT
Start: 2024-02-04 | End: 2024-02-05

## 2024-02-04 RX ORDER — BISACODYL 10 MG
10 SUPPOSITORY, RECTAL RECTAL DAILY PRN
Status: DISCONTINUED | OUTPATIENT
Start: 2024-02-04 | End: 2024-02-04 | Stop reason: SDUPTHER

## 2024-02-04 RX ORDER — NITROGLYCERIN 0.4 MG/1
0.4 TABLET SUBLINGUAL
Status: DISCONTINUED | OUTPATIENT
Start: 2024-02-04 | End: 2024-02-05 | Stop reason: HOSPADM

## 2024-02-04 RX ORDER — AMOXICILLIN 250 MG
2 CAPSULE ORAL 2 TIMES DAILY
Status: DISCONTINUED | OUTPATIENT
Start: 2024-02-04 | End: 2024-02-04 | Stop reason: SDUPTHER

## 2024-02-04 RX ORDER — ALPRAZOLAM 0.5 MG/1
1 TABLET ORAL 3 TIMES DAILY
Status: DISCONTINUED | OUTPATIENT
Start: 2024-02-04 | End: 2024-02-05 | Stop reason: HOSPADM

## 2024-02-04 RX ORDER — PANTOPRAZOLE SODIUM 40 MG/1
40 TABLET, DELAYED RELEASE ORAL EVERY MORNING
Status: DISCONTINUED | OUTPATIENT
Start: 2024-02-05 | End: 2024-02-05

## 2024-02-04 RX ORDER — CALCIUM CARBONATE 500 MG/1
2 TABLET, CHEWABLE ORAL 2 TIMES DAILY PRN
Status: DISCONTINUED | OUTPATIENT
Start: 2024-02-04 | End: 2024-02-05 | Stop reason: HOSPADM

## 2024-02-04 RX ORDER — POLYETHYLENE GLYCOL 3350 17 G/17G
17 POWDER, FOR SOLUTION ORAL DAILY PRN
Status: DISCONTINUED | OUTPATIENT
Start: 2024-02-04 | End: 2024-02-05 | Stop reason: HOSPADM

## 2024-02-04 RX ORDER — CARVEDILOL 6.25 MG/1
6.25 TABLET ORAL 2 TIMES DAILY WITH MEALS
Status: DISCONTINUED | OUTPATIENT
Start: 2024-02-05 | End: 2024-02-05 | Stop reason: HOSPADM

## 2024-02-04 RX ADMIN — ALPRAZOLAM 1 MG: 0.5 TABLET ORAL at 21:11

## 2024-02-04 RX ADMIN — NITROGLYCERIN 0.4 MG: 0.4 TABLET SUBLINGUAL at 18:56

## 2024-02-04 RX ADMIN — VALSARTAN 320 MG: 80 TABLET ORAL at 17:07

## 2024-02-04 RX ADMIN — CARVEDILOL 3.12 MG: 3.12 TABLET, FILM COATED ORAL at 17:07

## 2024-02-04 RX ADMIN — Medication 1 PATCH: at 22:49

## 2024-02-04 RX ADMIN — ALBUTEROL SULFATE 2.5 MG: 2.5 SOLUTION RESPIRATORY (INHALATION) at 19:59

## 2024-02-04 RX ADMIN — Medication 5 MG: at 21:11

## 2024-02-04 RX ADMIN — ATORVASTATIN CALCIUM 80 MG: 80 TABLET, FILM COATED ORAL at 21:11

## 2024-02-04 RX ADMIN — NITROGLYCERIN 0.4 MG: 0.4 TABLET SUBLINGUAL at 19:08

## 2024-02-04 RX ADMIN — PHENYTOIN SODIUM 100 MG: 100 CAPSULE ORAL at 21:11

## 2024-02-04 RX ADMIN — BUDESONIDE 1 MG: 0.5 INHALANT RESPIRATORY (INHALATION) at 19:59

## 2024-02-04 RX ADMIN — HYDROCODONE BITARTRATE AND ACETAMINOPHEN 1 TABLET: 10; 325 TABLET ORAL at 18:56

## 2024-02-04 RX ADMIN — CARVEDILOL 3.12 MG: 3.12 TABLET, FILM COATED ORAL at 19:53

## 2024-02-04 RX ADMIN — GABAPENTIN 800 MG: 400 CAPSULE ORAL at 21:11

## 2024-02-04 RX ADMIN — ALPRAZOLAM 1 MG: 0.5 TABLET ORAL at 17:08

## 2024-02-04 RX ADMIN — MORPHINE SULFATE 2 MG: 2 INJECTION, SOLUTION INTRAMUSCULAR; INTRAVENOUS at 19:53

## 2024-02-04 RX ADMIN — ENOXAPARIN SODIUM 90 MG: 100 INJECTION SUBCUTANEOUS at 17:08

## 2024-02-04 NOTE — PLAN OF CARE
Goal Outcome Evaluation:  Plan of Care Reviewed With: patient, spouse, family      No reports of chest pain since arrival.  NSR on EKG.  Eating well.

## 2024-02-04 NOTE — H&P
[facility]      Name:  Girish Loja Jr.   Age:  53 y.o.  Sex:  male  :  1970  MRN:  [id number]   Visit Number:  [id number]  Admission Date:  2024  Date Of Service:  24  Primary Care Physician:  Madhuri White APRN    Chief Complaint:     Chest discomfort    History Of Presenting Illness:      53-year-old gentleman with a history of stroke, anxiety, hypertension, COPD.  Reports for the last 1 month history of recurrent episodic dyspnea, chest discomfort, high heart rate and high blood pressure.  He reports that when this occurs it starts with shortness of breath.  As his shortness of breath builds he has heart rate blood pressure and chest pain are soon to follow.  He was also recently been treated for pneumonia as well as COPD exacerbation.  Reports every time he gets fixed he goes home and then shortly after has the spells again.  He still smokes.  About 6 cigarettes a day.  Used to smoke more.  Patient was managed at [place] [facility] for concern of unstable angina.  Was on a nitroglycerin drip and that was weaned off.  Was initially accepted to ICU status but once he was taken off the drip was more appropriate for telemetry.  He elects to be DO NOT RESUSCITATE DO NOT INTUBATE    Pertinent findings: WBC 17.7, creatinine 0.8, sodium 146, albumin 3.7, highly sensitive troponin 14.6, EKG shows sinus tachycardia with a heart rate of 108 with no obvious ischemic changes    ED Medications:    Medications - No data to display    Edited by: Austin Figueredo DO at 2024 [id number]     Review Of Systems:    All systems were reviewed and negative except as mentioned in history of presenting illness, assessment and plan.    Past Medical History: Patient  has a past medical history of Anxiety, CTS (carpal tunnel syndrome), Hypertension, and Seizures.    Past Surgical History: Patient  has a past surgical history that includes Abscess drainage and ERCP with  stent placement.    Social History: Patient  reports that he has been smoking cigarettes. He has a 8.75 pack-year smoking history. He has been exposed to tobacco smoke. He has never used smokeless tobacco. He reports that he does not drink alcohol and does not use drugs.    Family History:  Patient's family history has been reviewed and found to be noncontributory.     Allergies:      Penicillins    Home Medications:    Prior to Admission Medications       Prescriptions Last Dose Informant Patient Reported? Taking?    albuterol sulfate  (90 Base) MCG/ACT inhaler   No No    Inhale 2 puffs Every 4 (Four) Hours As Needed for Wheezing.    ALPRAZolam (XANAX) 1 MG tablet   Yes No    Take 1 mg by mouth 2 (Two) Times a Day.    amLODIPine (NORVASC) 10 MG tablet   Yes No    Take 10 mg by mouth Daily.    clopidogrel (PLAVIX) 75 MG tablet   No No    Take 1 tablet by mouth Daily.    gabapentin (NEURONTIN) 600 MG tablet   Yes No    Take 600 mg by mouth 3 (Three) Times a Day.    HYDROcodone-acetaminophen (NORCO)  MG per tablet   Yes No    Take 1 tablet by mouth Every 6 (Six) Hours As Needed for Moderate Pain .    meclizine (ANTIVERT) 25 MG tablet   Yes No    Take 25 mg by mouth 3 (Three) Times a Day As Needed.    nitroglycerin (NITROSTAT) 0.4 MG SL tablet   Yes No    pantoprazole (PROTONIX) 40 MG EC tablet   No No    Take 1 tablet by mouth Every Morning.    phenytoin ER (DILANTIN) 100 MG capsule   Yes No    Take  by mouth 2 (Two) Times a Day.    pravastatin (Pravachol) 40 MG tablet   No No    Take 2 tablets by mouth Daily.    valsartan-hydrochlorothiazide (DIOVAN-HCT) 320-12.5 MG per tablet   Yes No              Vital Signs:  Temp:  [97.9 °F (36.6 °C)] 97.9 °F (36.6 °C)  Heart Rate:  [90] 90  Resp:  [18] 18  BP: (132)/(93) 132/93        02/04/24  1545   Weight: 91.6 kg (201 lb 15.1 oz)     Body mass index is 32.59 kg/m².    Physical Exam:     Most recent vital Signs: /93 (BP Location: Right arm, Patient  "Position: Sitting)   Pulse 90   Temp 97.9 °F (36.6 °C) (Oral)   Resp 18   Wt 91.6 kg (201 lb 15.1 oz)   SpO2 95%   BMI 32.59 kg/m²     Constitutional: Awake, alert  Eyes: PERRLA, sclerae anicteric, no conjunctival injection  HENT: NCAT, mucous membranes moist  Neck: Supple, no thyromegaly, no lymphadenopathy, trachea midline  Respiratory: Coarse expiratory breath sounds without wheeze bilaterally, nonlabored respirations   Cardiovascular: RRR, no murmurs, rubs, or gallops, palpable pedal pulses bilaterally  Gastrointestinal: Positive bowel sounds, soft, nontender, nondistended  Musculoskeletal: No bilateral ankle edema, no clubbing or cyanosis to extremities  Psychiatric: Appropriate affect, cooperative  Neurologic: Oriented x 3, speech clear  Skin: No rashes  Edited by: Austin Figueredo DO at 2/4/2024 8646      Laboratory data:    I have reviewed the labs done in the emergency room.          Invalid input(s): \"LABALBU\", \"PROT\"                                    Invalid input(s): \"USDES\", \"NITRITITE\", \"BACT\", \"EP\"    Pain Management Panel  More data may exist         Latest Ref Rng & Units 1/20/2022 1/18/2022   Pain Management Panel   Amphetamine, Urine Qual Negative Negative  Negative    Barbiturates Screen, Urine Negative Negative  Negative    Benzodiazepine Screen, Urine Negative Negative  Negative    Buprenorphine, Screen, Urine Negative Negative  Negative    Cocaine Screen, Urine Negative Negative  Negative    Methadone Screen , Urine Negative Negative  Negative    Methamphetamine, Ur Negative Negative  Negative        EKG:      Sinus tachycardia without obvious ischemic changes    Radiology:    No radiology results for the last 3 days    Assessment/Plan:      Coronary artery disease involving native heart with unstable angina pectoris    COPD (chronic obstructive pulmonary disease)    Tobacco use    -Observation telemetry, consult to Dr. Medina.  Continue bronchodilators inhaled corticosteroids.  " Aspirin, Plavix statin.  Treatment dose Lovenox.  14 mg nicotine patch.  Edited by: Austin Figueredo DO at 2/4/2024 1627           Risk Assessment: moderate   DVT Prophylaxis: lovenox  Code Status:   There are no questions and answers to display.      Diet:   Dietary Orders (From admission, onward)       Start     Ordered    02/05/24 0001  NPO Diet NPO Type: Strict NPO  Diet Effective Midnight        Question:  NPO Type  Answer:  Strict NPO    02/04/24 1632    02/04/24 1631  Diet: Cardiac Diets; Healthy Heart (2-3 Na+); Texture: Regular Texture (IDDSI 7); Fluid Consistency: Thin (IDDSI 0)  Diet Effective Now        References:    Diet Order [place]   Question Answer Comment   Diets: Cardiac Diets    Cardiac Diet: Healthy Heart (2-3 Na+)    Texture: Regular Texture (IDDSI 7)    Fluid Consistency: Thin (IDDSI 0)        02/04/24 1632                     Advance Care Planning   ACP discussion was held with the patient during this visit. Patient does not have an advance directive, declines further assistance.           Austin Figueredo DO  02/04/24  16:33 EST    Dictated utilizing Dragon dictation.

## 2024-02-05 ENCOUNTER — APPOINTMENT (OUTPATIENT)
Dept: CT IMAGING | Facility: HOSPITAL | Age: 54
End: 2024-02-05
Payer: COMMERCIAL

## 2024-02-05 ENCOUNTER — ANESTHESIA EVENT (OUTPATIENT)
Dept: GASTROENTEROLOGY | Facility: HOSPITAL | Age: 54
End: 2024-02-05
Payer: COMMERCIAL

## 2024-02-05 ENCOUNTER — ANESTHESIA (OUTPATIENT)
Dept: GASTROENTEROLOGY | Facility: HOSPITAL | Age: 54
End: 2024-02-05
Payer: COMMERCIAL

## 2024-02-05 VITALS
HEIGHT: 66 IN | OXYGEN SATURATION: 94 % | WEIGHT: 198.63 LBS | DIASTOLIC BLOOD PRESSURE: 74 MMHG | SYSTOLIC BLOOD PRESSURE: 116 MMHG | HEART RATE: 95 BPM | TEMPERATURE: 97.5 F | BODY MASS INDEX: 31.92 KG/M2 | RESPIRATION RATE: 18 BRPM

## 2024-02-05 PROBLEM — R07.89 ATYPICAL CHEST PAIN: Status: ACTIVE | Noted: 2024-02-03

## 2024-02-05 LAB
ALBUMIN SERPL-MCNC: 3.8 G/DL (ref 3.5–5.2)
ALBUMIN/GLOB SERPL: 1.5 G/DL
ALP SERPL-CCNC: 67 U/L (ref 39–117)
ALT SERPL W P-5'-P-CCNC: 129 U/L (ref 1–41)
ANION GAP SERPL CALCULATED.3IONS-SCNC: 11.2 MMOL/L (ref 5–15)
AST SERPL-CCNC: 51 U/L (ref 1–40)
BILIRUB SERPL-MCNC: 0.2 MG/DL (ref 0–1.2)
BUN SERPL-MCNC: 20 MG/DL (ref 6–20)
BUN/CREAT SERPL: 32.3 (ref 7–25)
CALCIUM SPEC-SCNC: 8.6 MG/DL (ref 8.6–10.5)
CHLORIDE SERPL-SCNC: 101 MMOL/L (ref 98–107)
CO2 SERPL-SCNC: 23.8 MMOL/L (ref 22–29)
CREAT SERPL-MCNC: 0.62 MG/DL (ref 0.76–1.27)
DEPRECATED RDW RBC AUTO: 49.9 FL (ref 37–54)
EGFRCR SERPLBLD CKD-EPI 2021: 115.7 ML/MIN/1.73
ERYTHROCYTE [DISTWIDTH] IN BLOOD BY AUTOMATED COUNT: 14.6 % (ref 12.3–15.4)
GLOBULIN UR ELPH-MCNC: 2.5 GM/DL
GLUCOSE BLDC GLUCOMTR-MCNC: 109 MG/DL (ref 70–130)
GLUCOSE SERPL-MCNC: 100 MG/DL (ref 65–99)
HCT VFR BLD AUTO: 38.6 % (ref 37.5–51)
HGB BLD-MCNC: 12.7 G/DL (ref 13–17.7)
KOH PREP NAIL: ABNORMAL
MCH RBC QN AUTO: 30.8 PG (ref 26.6–33)
MCHC RBC AUTO-ENTMCNC: 32.9 G/DL (ref 31.5–35.7)
MCV RBC AUTO: 93.7 FL (ref 79–97)
PLATELET # BLD AUTO: 296 10*3/MM3 (ref 140–450)
PMV BLD AUTO: 10.7 FL (ref 6–12)
POTASSIUM SERPL-SCNC: 4.4 MMOL/L (ref 3.5–5.2)
PROT SERPL-MCNC: 6.3 G/DL (ref 6–8.5)
QT INTERVAL: 344 MS
QT INTERVAL: 358 MS
QTC INTERVAL: 433 MS
QTC INTERVAL: 443 MS
RBC # BLD AUTO: 4.12 10*6/MM3 (ref 4.14–5.8)
SODIUM SERPL-SCNC: 136 MMOL/L (ref 136–145)
TROPONIN T SERPL HS-MCNC: 10 NG/L
TROPONIN T SERPL HS-MCNC: 13 NG/L
WBC NRBC COR # BLD AUTO: 12.4 10*3/MM3 (ref 3.4–10.8)

## 2024-02-05 PROCEDURE — 25010000002 METOCLOPRAMIDE PER 10 MG: Performed by: NURSE ANESTHETIST, CERTIFIED REGISTERED

## 2024-02-05 PROCEDURE — 99239 HOSP IP/OBS DSCHRG MGMT >30: CPT | Performed by: INTERNAL MEDICINE

## 2024-02-05 PROCEDURE — 25510000001 IOPAMIDOL 61 % SOLUTION: Performed by: INTERNAL MEDICINE

## 2024-02-05 PROCEDURE — 43239 EGD BIOPSY SINGLE/MULTIPLE: CPT | Performed by: INTERNAL MEDICINE

## 2024-02-05 PROCEDURE — 25810000003 SODIUM CHLORIDE 0.9 % SOLUTION: Performed by: INTERNAL MEDICINE

## 2024-02-05 PROCEDURE — G0378 HOSPITAL OBSERVATION PER HR: HCPCS

## 2024-02-05 PROCEDURE — 86708 HEPATITIS A ANTIBODY: CPT | Performed by: INTERNAL MEDICINE

## 2024-02-05 PROCEDURE — 87220 TISSUE EXAM FOR FUNGI: CPT | Performed by: INTERNAL MEDICINE

## 2024-02-05 PROCEDURE — 25010000002 MORPHINE PER 10 MG: Performed by: INTERNAL MEDICINE

## 2024-02-05 PROCEDURE — 80053 COMPREHEN METABOLIC PANEL: CPT | Performed by: INTERNAL MEDICINE

## 2024-02-05 PROCEDURE — 86704 HEP B CORE ANTIBODY TOTAL: CPT | Performed by: INTERNAL MEDICINE

## 2024-02-05 PROCEDURE — 25010000002 ENOXAPARIN PER 10 MG: Performed by: INTERNAL MEDICINE

## 2024-02-05 PROCEDURE — 25010000002 PROPOFOL 10 MG/ML EMULSION: Performed by: NURSE ANESTHETIST, CERTIFIED REGISTERED

## 2024-02-05 PROCEDURE — 71275 CT ANGIOGRAPHY CHEST: CPT

## 2024-02-05 PROCEDURE — 82948 REAGENT STRIP/BLOOD GLUCOSE: CPT | Performed by: INTERNAL MEDICINE

## 2024-02-05 PROCEDURE — 25010000002 METHYLPREDNISOLONE PER 80 MG: Performed by: INTERNAL MEDICINE

## 2024-02-05 PROCEDURE — 96376 TX/PRO/DX INJ SAME DRUG ADON: CPT

## 2024-02-05 PROCEDURE — 94799 UNLISTED PULMONARY SVC/PX: CPT

## 2024-02-05 PROCEDURE — 94761 N-INVAS EAR/PLS OXIMETRY MLT: CPT

## 2024-02-05 PROCEDURE — 93010 ELECTROCARDIOGRAM REPORT: CPT | Performed by: INTERNAL MEDICINE

## 2024-02-05 PROCEDURE — 84484 ASSAY OF TROPONIN QUANT: CPT | Performed by: INTERNAL MEDICINE

## 2024-02-05 PROCEDURE — 99204 OFFICE O/P NEW MOD 45 MIN: CPT | Performed by: INTERNAL MEDICINE

## 2024-02-05 PROCEDURE — 85027 COMPLETE CBC AUTOMATED: CPT | Performed by: INTERNAL MEDICINE

## 2024-02-05 PROCEDURE — 96375 TX/PRO/DX INJ NEW DRUG ADDON: CPT

## 2024-02-05 PROCEDURE — 94664 DEMO&/EVAL PT USE INHALER: CPT

## 2024-02-05 PROCEDURE — 99214 OFFICE O/P EST MOD 30 MIN: CPT | Performed by: INTERNAL MEDICINE

## 2024-02-05 PROCEDURE — 96372 THER/PROPH/DIAG INJ SC/IM: CPT

## 2024-02-05 PROCEDURE — 93005 ELECTROCARDIOGRAM TRACING: CPT | Performed by: INTERNAL MEDICINE

## 2024-02-05 RX ORDER — METOCLOPRAMIDE HYDROCHLORIDE 5 MG/ML
INJECTION INTRAMUSCULAR; INTRAVENOUS AS NEEDED
Status: DISCONTINUED | OUTPATIENT
Start: 2024-02-05 | End: 2024-02-05 | Stop reason: SURG

## 2024-02-05 RX ORDER — MORPHINE SULFATE 2 MG/ML
2 INJECTION, SOLUTION INTRAMUSCULAR; INTRAVENOUS
Status: DISCONTINUED | OUTPATIENT
Start: 2024-02-05 | End: 2024-02-05 | Stop reason: HOSPADM

## 2024-02-05 RX ORDER — HYDROCHLOROTHIAZIDE 12.5 MG/1
12.5 TABLET ORAL DAILY PRN
COMMUNITY

## 2024-02-05 RX ORDER — PANTOPRAZOLE SODIUM 40 MG/10ML
40 INJECTION, POWDER, LYOPHILIZED, FOR SOLUTION INTRAVENOUS
Status: DISCONTINUED | OUTPATIENT
Start: 2024-02-05 | End: 2024-02-05 | Stop reason: HOSPADM

## 2024-02-05 RX ORDER — METHYLPREDNISOLONE ACETATE 80 MG/ML
80 INJECTION, SUSPENSION INTRA-ARTICULAR; INTRALESIONAL; INTRAMUSCULAR; SOFT TISSUE ONCE
Status: COMPLETED | OUTPATIENT
Start: 2024-02-05 | End: 2024-02-05

## 2024-02-05 RX ORDER — LIDOCAINE HCL/PF 100 MG/5ML
SYRINGE (ML) INJECTION AS NEEDED
Status: DISCONTINUED | OUTPATIENT
Start: 2024-02-05 | End: 2024-02-05 | Stop reason: SURG

## 2024-02-05 RX ORDER — FAMOTIDINE 20 MG/1
20 TABLET, FILM COATED ORAL ONCE
Status: COMPLETED | OUTPATIENT
Start: 2024-02-05 | End: 2024-02-05

## 2024-02-05 RX ORDER — FLUCONAZOLE 100 MG/1
200 TABLET ORAL EVERY 24 HOURS
Status: DISCONTINUED | OUTPATIENT
Start: 2024-02-05 | End: 2024-02-05 | Stop reason: HOSPADM

## 2024-02-05 RX ORDER — IPRATROPIUM BROMIDE AND ALBUTEROL SULFATE 2.5; .5 MG/3ML; MG/3ML
3 SOLUTION RESPIRATORY (INHALATION) EVERY 4 HOURS PRN
Status: DISCONTINUED | OUTPATIENT
Start: 2024-02-05 | End: 2024-02-05

## 2024-02-05 RX ORDER — NICOTINE 21 MG/24HR
1 PATCH, TRANSDERMAL 24 HOURS TRANSDERMAL EVERY 24 HOURS
Qty: 14 EACH | Refills: 0 | Status: SHIPPED | OUTPATIENT
Start: 2024-02-05

## 2024-02-05 RX ORDER — IPRATROPIUM BROMIDE AND ALBUTEROL SULFATE 2.5; .5 MG/3ML; MG/3ML
3 SOLUTION RESPIRATORY (INHALATION)
Status: DISCONTINUED | OUTPATIENT
Start: 2024-02-05 | End: 2024-02-05 | Stop reason: HOSPADM

## 2024-02-05 RX ORDER — FLUCONAZOLE 200 MG/1
200 TABLET ORAL EVERY 24 HOURS
Qty: 13 TABLET | Refills: 0 | Status: SHIPPED | OUTPATIENT
Start: 2024-02-06 | End: 2024-02-19

## 2024-02-05 RX ORDER — ALBUTEROL SULFATE 2.5 MG/3ML
2.5 SOLUTION RESPIRATORY (INHALATION) EVERY 6 HOURS PRN
Status: DISCONTINUED | OUTPATIENT
Start: 2024-02-05 | End: 2024-02-05 | Stop reason: HOSPADM

## 2024-02-05 RX ORDER — PROPOFOL 10 MG/ML
VIAL (ML) INTRAVENOUS AS NEEDED
Status: DISCONTINUED | OUTPATIENT
Start: 2024-02-05 | End: 2024-02-05 | Stop reason: SURG

## 2024-02-05 RX ORDER — BUDESONIDE AND FORMOTEROL FUMARATE DIHYDRATE 160; 4.5 UG/1; UG/1
2 AEROSOL RESPIRATORY (INHALATION)
Status: DISCONTINUED | OUTPATIENT
Start: 2024-02-05 | End: 2024-02-05 | Stop reason: HOSPADM

## 2024-02-05 RX ORDER — SODIUM CHLORIDE 9 MG/ML
70 INJECTION, SOLUTION INTRAVENOUS CONTINUOUS PRN
Status: DISCONTINUED | OUTPATIENT
Start: 2024-02-05 | End: 2024-02-05 | Stop reason: HOSPADM

## 2024-02-05 RX ORDER — ENOXAPARIN SODIUM 100 MG/ML
40 INJECTION SUBCUTANEOUS EVERY 24 HOURS
Status: DISCONTINUED | OUTPATIENT
Start: 2024-02-06 | End: 2024-02-05 | Stop reason: HOSPADM

## 2024-02-05 RX ORDER — ALUMINA, MAGNESIA, AND SIMETHICONE 2400; 2400; 240 MG/30ML; MG/30ML; MG/30ML
10 SUSPENSION ORAL EVERY 6 HOURS PRN
Status: DISCONTINUED | OUTPATIENT
Start: 2024-02-05 | End: 2024-02-05 | Stop reason: HOSPADM

## 2024-02-05 RX ORDER — PANTOPRAZOLE SODIUM 40 MG/1
40 TABLET, DELAYED RELEASE ORAL EVERY MORNING
Qty: 30 TABLET | Refills: 0 | Status: SHIPPED | OUTPATIENT
Start: 2024-02-05

## 2024-02-05 RX ORDER — SUCRALFATE 1 G/1
1 TABLET ORAL
Status: DISCONTINUED | OUTPATIENT
Start: 2024-02-05 | End: 2024-02-05 | Stop reason: HOSPADM

## 2024-02-05 RX ORDER — SUCRALFATE 1 G/1
1 TABLET ORAL
Qty: 39 TABLET | Refills: 0 | Status: SHIPPED | OUTPATIENT
Start: 2024-02-05 | End: 2024-02-15

## 2024-02-05 RX ADMIN — METOCLOPRAMIDE 10 MG: 5 INJECTION, SOLUTION INTRAMUSCULAR; INTRAVENOUS at 11:04

## 2024-02-05 RX ADMIN — AMLODIPINE BESYLATE 10 MG: 5 TABLET ORAL at 12:57

## 2024-02-05 RX ADMIN — VALSARTAN 320 MG: 80 TABLET ORAL at 13:06

## 2024-02-05 RX ADMIN — HYDROCHLOROTHIAZIDE 12.5 MG: 12.5 CAPSULE ORAL at 12:56

## 2024-02-05 RX ADMIN — NITROGLYCERIN 0.4 MG: 0.4 TABLET SUBLINGUAL at 01:00

## 2024-02-05 RX ADMIN — BUDESONIDE 1 MG: 0.5 INHALANT RESPIRATORY (INHALATION) at 07:20

## 2024-02-05 RX ADMIN — NITROGLYCERIN 0.4 MG: 0.4 TABLET SUBLINGUAL at 01:21

## 2024-02-05 RX ADMIN — PANTOPRAZOLE SODIUM 40 MG: 40 INJECTION, POWDER, FOR SOLUTION INTRAVENOUS at 09:20

## 2024-02-05 RX ADMIN — SODIUM CHLORIDE: 9 INJECTION, SOLUTION INTRAVENOUS at 10:51

## 2024-02-05 RX ADMIN — ENOXAPARIN SODIUM 90 MG: 100 INJECTION SUBCUTANEOUS at 05:31

## 2024-02-05 RX ADMIN — HYDROCODONE BITARTRATE AND ACETAMINOPHEN 1 TABLET: 10; 325 TABLET ORAL at 13:07

## 2024-02-05 RX ADMIN — HYDROCODONE BITARTRATE AND ACETAMINOPHEN 1 TABLET: 10; 325 TABLET ORAL at 02:25

## 2024-02-05 RX ADMIN — IPRATROPIUM BROMIDE AND ALBUTEROL SULFATE 3 ML: .5; 3 SOLUTION RESPIRATORY (INHALATION) at 12:53

## 2024-02-05 RX ADMIN — ALUMINUM HYDROXIDE, MAGNESIUM HYDROXIDE, AND DIMETHICONE 10 ML: 400; 400; 40 SUSPENSION ORAL at 01:31

## 2024-02-05 RX ADMIN — PROPOFOL 100 MG: 10 INJECTION, EMULSION INTRAVENOUS at 10:51

## 2024-02-05 RX ADMIN — ALBUTEROL SULFATE 2.5 MG: 2.5 SOLUTION RESPIRATORY (INHALATION) at 07:20

## 2024-02-05 RX ADMIN — FLUCONAZOLE 200 MG: 100 TABLET ORAL at 13:48

## 2024-02-05 RX ADMIN — Medication 100 MG: at 10:51

## 2024-02-05 RX ADMIN — ALBUTEROL SULFATE 2.5 MG: 2.5 SOLUTION RESPIRATORY (INHALATION) at 02:31

## 2024-02-05 RX ADMIN — MORPHINE SULFATE 2 MG: 2 INJECTION, SOLUTION INTRAMUSCULAR; INTRAVENOUS at 05:13

## 2024-02-05 RX ADMIN — ALPRAZOLAM 1 MG: 0.5 TABLET ORAL at 13:07

## 2024-02-05 RX ADMIN — METHYLPREDNISOLONE ACETATE 80 MG: 80 INJECTION, SUSPENSION INTRA-ARTICULAR; INTRALESIONAL; INTRAMUSCULAR; SOFT TISSUE at 13:49

## 2024-02-05 RX ADMIN — IPRATROPIUM BROMIDE AND ALBUTEROL SULFATE 3 ML: .5; 3 SOLUTION RESPIRATORY (INHALATION) at 05:34

## 2024-02-05 RX ADMIN — CLOPIDOGREL BISULFATE 75 MG: 75 TABLET ORAL at 12:55

## 2024-02-05 RX ADMIN — SUCRALFATE 1 G: 1 TABLET ORAL at 12:55

## 2024-02-05 RX ADMIN — IOPAMIDOL 85 ML: 612 INJECTION, SOLUTION INTRAVENOUS at 04:08

## 2024-02-05 RX ADMIN — GABAPENTIN 800 MG: 400 CAPSULE ORAL at 13:49

## 2024-02-05 RX ADMIN — NITROGLYCERIN 0.4 MG: 0.4 TABLET SUBLINGUAL at 01:12

## 2024-02-05 RX ADMIN — MORPHINE SULFATE 2 MG: 2 INJECTION, SOLUTION INTRAMUSCULAR; INTRAVENOUS at 00:48

## 2024-02-05 RX ADMIN — MORPHINE SULFATE 2 MG: 2 INJECTION, SOLUTION INTRAMUSCULAR; INTRAVENOUS at 08:42

## 2024-02-05 RX ADMIN — FAMOTIDINE 20 MG: 20 TABLET, FILM COATED ORAL at 01:30

## 2024-02-05 NOTE — ANESTHESIA PREPROCEDURE EVALUATION
Anesthesia Evaluation     Patient summary reviewed and Nursing notes reviewed   NPO Solid Status: > 8 hours  NPO Liquid Status: > 8 hours           Airway   Mallampati: II  TM distance: >3 FB  Neck ROM: full  Possible difficult intubation  Dental      Pulmonary    (+) COPD,  Cardiovascular     (+) hypertension, CAD, angina      Neuro/Psych  (+) seizures, numbness, psychiatric history  GI/Hepatic/Renal/Endo    (+) obesity    Musculoskeletal     Abdominal    Substance History      OB/GYN          Other        ROS/Med Hx Other: Atypical chest pain  COPD (chronic obstructive pulmonary disease)  Coronary artery disease involving native heart with unstable angina pectoris  Cutaneous abscess of buttock  Ischemic optic neuropathy  PFO (patent foramen ovale)  Precordial pain  Tobacco use  Unstable angina  Vision loss of right eye                  Anesthesia Plan    ASA 3     MAC     intravenous induction     Anesthetic plan, risks, benefits, and alternatives have been provided, discussed and informed consent has been obtained with: patient.  Pre-procedure education provided  Plan discussed with CRNA.    CODE STATUS:    Medical Intervention Limits: NO intubation (DNI)  Code Status (Patient has no pulse and is not breathing): No CPR (Do Not Attempt to Resuscitate)  Medical Interventions (Patient has pulse or is breathing): Limited Support      
no

## 2024-02-05 NOTE — DISCHARGE SUMMARY
[facility]      Name:  Girish oLja Jr.   Age:  53 y.o.  Sex:  male  :  1970  MRN:  [id number]   Visit Number:  [id number]    Admission Date:  2024  Date of Discharge:  2024  Primary Care Physician:  Madhuri White APRN    Important issues to note:    Start: Diflucan, Trelegy, nicotine patch, Carafate  Stop: Nothing  Follow up: PCP and pulmonology and gastroenterology  Brief Summary: Presented with chest pain due to unclear cause suspect combination of GI and respiratory pathology being treated with PPIs Carafate and bronchodilators and inhaled corticosteroids.  Will have close follow-up with GI and pulmonary outpatient.  Chest pain thought noncardiac.    DME statements:  The patient is physically incapable of utilizing regular toilet facilities. Use of a bedside commode is necessary as they are confined to one level of home  with no toileting facilities available on that level.  Patient has a mobility limitation that significantly impairs their ability to participate in one or more mobility- related activities of daily living. The patient is able to safely use the walker. The functional mobility deficit can be sufficiently resolved by use of a walker.     Discharge Diagnoses:       Unstable angina    Coronary artery disease involving native heart with unstable angina pectoris    COPD (chronic obstructive pulmonary disease)    Tobacco use    Atypical chest pain        Problem List:     Active Hospital Problems    Diagnosis  POA    **Unstable angina [I20.0]  Yes    Coronary artery disease involving native heart with unstable angina pectoris [I25.110]  Yes    COPD (chronic obstructive pulmonary disease) [J44.9]  Yes    Tobacco use [Z72.0]  Yes    Atypical chest pain [R07.89]  Unknown      Resolved Hospital Problems   No resolved problems to display.     Presenting Problem:    No chief complaint on file.     Consults:     Consulting Physician(s)          Provider   Role Specialty     Herbie Bowres MD  Consulting Physician Cardiology     Nila Clark MD  Consulting Physician Pulmonary Disease     Roberta Chaparro MD  Consulting Physician Gastroenterology          Procedures Performed:    Procedure(s):  ESOPHAGOGASTRODUODENOSCOPY WITH BRUSHING      History of presenting illness:    53-year-old gentleman with a history of stroke, anxiety, hypertension, COPD.  Reports for the last 1 month history of recurrent episodic dyspnea, chest discomfort, high heart rate and high blood pressure.  He reports that when this occurs it starts with shortness of breath.  As his shortness of breath builds he has heart rate blood pressure and chest pain are soon to follow.  He was also recently been treated for pneumonia as well as COPD exacerbation.  Reports every time he gets fixed he goes home and then shortly after has the spells again.  He still smokes.  About 6 cigarettes a day.  Used to smoke more.  Patient was managed at [facility] for concern of unstable angina.  Was on a nitroglycerin drip and that was weaned off.  Was initially accepted to ICU status but once he was taken off the drip was more appropriate for telemetry.  He elects to be DO NOT RESUSCITATE DO NOT INTUBATE    Pertinent findings: WBC 17.7, creatinine 0.8, sodium 146, albumin 3.7, highly sensitive troponin 14.6, EKG shows sinus tachycardia with a heart rate of 108 with no obvious ischemic changes    Hospital course:     Coronary artery disease involving native heart with unstable angina pectoris, ruled out    Candida esophagitis    COPD (chronic obstructive pulmonary disease)    Tobacco use    -Gastroenterology examined the patient EGD suggested candidal esophagitis. Continued on antifungals with Diflucan and PPIs.  Cardiology examined the patient and felt was noncardiac chest pain.   Pulmonology examined the patient and made recommendations for home regimen and follow-up for COPD.   Continued on long-acting inhaled corticosteroids and bronchodilators.  Close follow-up with pulmonology and gastroenterology.  14 mg nicotine patch.        Vital Signs:    Temp:  [97.5 °F (36.4 °C)-98.3 °F (36.8 °C)] 97.5 °F (36.4 °C)  Heart Rate:  [] 95  Resp:  [16-24] 18  BP: ()/(47-99) 116/74    Physical Exam:    Constitutional: Awake, alert  Eyes: PERRLA, sclerae anicteric, no conjunctival injection  HENT: NCAT, mucous membranes moist  Neck: Supple, no thyromegaly, no lymphadenopathy, trachea midline  Respiratory: Coarse expiratory breath sounds without wheeze bilaterally, nonlabored respirations   Cardiovascular: RRR, no murmurs, rubs, or gallops, palpable pedal pulses bilaterally  Gastrointestinal: Positive bowel sounds, soft, nontender, nondistended  Musculoskeletal: No bilateral ankle edema, no clubbing or cyanosis to extremities  Psychiatric: Appropriate affect, cooperative  Neurologic: Oriented x 3, speech clear  Skin: No rashes    Exam stable 2-5-2024    Pertinent Lab Results:     Results from last 7 days   Lab Units 02/05/24  0546 02/04/24  1642   SODIUM mmol/L 136 137   POTASSIUM mmol/L 4.4 3.8   CHLORIDE mmol/L 101 102   CO2 mmol/L 23.8 24.5   BUN mg/dL 20 13   CREATININE mg/dL 0.62* 0.56*   CALCIUM mg/dL 8.6 8.8   BILIRUBIN mg/dL 0.2  --    ALK PHOS U/L 67  --    ALT (SGPT) U/L 129*  --    AST (SGOT) U/L 51*  --    GLUCOSE mg/dL 100* 98     Results from last 7 days   Lab Units 02/05/24  0546   WBC 10*3/mm3 12.40*   HEMOGLOBIN g/dL 12.7*   HEMATOCRIT % 38.6   PLATELETS 10*3/mm3 296         Results from last 7 days   Lab Units 02/05/24  0546 02/05/24  0132 02/04/24  1838   HSTROP T ng/L 10 13 10                           Pertinent Radiology Results:    Imaging Results (All)       Procedure Component Value Units Date/Time    CT Angiogram Chest Pulmonary Embolism [[id number]] Collected: 02/05/24 0426     Updated: 02/05/24 0428    Narrative:      FINAL REPORT    TECHNIQUE:  null    CLINICAL  HISTORY:  SOB and anterior chest pain bilateral    COMPARISON:  null    FINDINGS:  CTA chest with IV contrast. MIP images also submitted for evaluation.    Comparison: None    Findings:    The thyroid is unremarkable.    Normal caliber of the thoracic aorta. Mild atherosclerosis of the thoracic aorta.    The heart is normal size. Coronary atherosclerosis.    The mediastinum is intact.    Fluid throughout the majority of the esophagus.    No pulmonary embolism.    Paraseptal and centrilobular emphysematous disease. Subsegmental atelectasis involving the lung bases, no consolidation or pleural effusion.    Bronchial thickening within the bilateral lungs greatest involving the lung bases may indicate reactive airways disease including bronchitis.    Degenerative changes of the thoracic spine.    Question mild nodularity of the periphery of the left lobe of the liver may indicate cirrhotic liver morphology.      Impression:      IMPRESSION:    1. No pulmonary embolism.    2. Paraseptal and centrilobular emphysematous disease. Subsegmental atelectasis involving the lung bases, no consolidation or pleural effusion.    3. Bronchial thickening within the bilateral lungs greatest involving the lung bases may indicate reactive airways disease including bronchitis.    4. Question mild nodularity of the periphery of the left lobe of the liver may indicate cirrhotic liver morphology.    5. Fluid throughout the majority of the esophagus.    6. Coronary atherosclerosis.    Authenticated and Electronically Signed by Agustin Rivera DO on  02/05/2024 04:26:52 AM            Echo:    Results for orders placed during the hospital encounter of 01/18/22    Adult Transesophageal Echo (DAMIAN) W/ Cont if Necessary Per Protocol    Interpretation Summary  · Estimated left ventricular EF = 65% Left ventricular ejection fraction appears to be 61 - 65%. Left ventricular systolic function is normal.  · Normal RV size and function  · RA and LA are  normal  · Aortic and mitral valves show normal structure and motion without significant MR  · LA appendage is narrow and normal  · Saline contrast study showed negative contrast suggesting Left to Right shuntin via PFO but no crossing of bubbles. There was no ASD and valsalva maneuver could not be performed due to heavy sedation  · Study compared to TTE of this admission and no change noted except finding of PFO with Left to right shunting.  · A second jet of blood flow noted in the right ventricle which may be due to atrio-ventricular inflow.    Condition on Discharge:      Stable.    Code status during the hospital stay:    Code Status and Medical Interventions:   Ordered at: 02/04/24 1636     Medical Intervention Limits:    NO intubation (DNI)     Code Status (Patient has no pulse and is not breathing):    No CPR (Do Not Attempt to Resuscitate)     Medical Interventions (Patient has pulse or is breathing):    Limited Support     Discharge Disposition:    Home or Self Care    Discharge Medications:       Discharge Medications        New Medications        Instructions Start Date   fluconazole 200 MG tablet  Commonly known as: DIFLUCAN   200 mg, Oral, Every 24 Hours   Start Date: February 6, 2024     Fluticasone-Umeclidin-Vilant 100-62.5-25 MCG/ACT inhaler  Commonly known as: TRELEGY   1 puff, Inhalation, Daily - RT, Rinse mouth with water after use.      nicotine 14 MG/24HR patch  Commonly known as: NICODERM CQ   1 patch, Transdermal, Every 24 Hours      sucralfate 1 g tablet  Commonly known as: CARAFATE   1 g, Oral, 4 Times Daily Before Meals & Nightly             Continue These Medications        Instructions Start Date   albuterol sulfate  (90 Base) MCG/ACT inhaler  Commonly known as: PROVENTIL HFA;VENTOLIN HFA;PROAIR HFA   2 puffs, Inhalation, Every 4 Hours PRN      ALPRAZolam 1 MG tablet  Commonly known as: XANAX   1 mg, Oral, 2 Times Daily      amLODIPine 10 MG tablet  Commonly known as: NORVASC   10  mg, Oral, Daily      clopidogrel 75 MG tablet  Commonly known as: PLAVIX   75 mg, Oral, Daily      gabapentin 600 MG tablet  Commonly known as: NEURONTIN   600 mg, Oral, 3 Times Daily      hydroCHLOROthiazide 12.5 MG tablet  Commonly known as: HYDRODIURIL   12.5 mg, Oral, Daily PRN      HYDROcodone-acetaminophen  MG per tablet  Commonly known as: NORCO   1 tablet, Oral, Every 6 Hours PRN      meclizine 25 MG tablet  Commonly known as: ANTIVERT   25 mg, Oral, 3 Times Daily PRN      nitroglycerin 0.4 MG SL tablet  Commonly known as: NITROSTAT   No dose, route, or frequency recorded.      pantoprazole 40 MG EC tablet  Commonly known as: PROTONIX   40 mg, Oral, Every Morning      pravastatin 40 MG tablet  Commonly known as: Pravachol   80 mg, Oral, Daily      valsartan-hydrochlorothiazide 320-12.5 MG per tablet  Commonly known as: DIOVAN-HCT   No dose, route, or frequency recorded.             Discharge Diet:     Diet Instructions       Advance Diet As Tolerated -Target Diet: cardiac      Target Diet: cardiac          Activity at Discharge:       Follow-up Appointments:    Additional Instructions for the Follow-ups that You Need to Schedule       Ambulatory Referral to Disease State Management   As directed      Follow-up in 14-21 days after discharge    Order Comments: Follow-up in 14-21 days after discharge    To dept: [facility] [[id number]]   What program(s) are you referring for?: COPD   Follow-up needed: No        Discharge Follow-up with Specified Provider: Dr. Clark OR Lakisha Gamez; 4 Months   As directed      To: Dr. Clark OR Lakisha Gamez   Follow Up: 4 Months   Follow Up Details: If being discharged on a weekend, please call our office on the next working day to schedule this appointment.        Discharge Follow-up with Specified Provider: PCP; 1 Week   As directed      To: PCP   Follow Up: 1 Week        Discharge Follow-up with Specified Provider: dr watt; 2 Months   As directed       To: dr watt   Follow Up: 2 Months               Follow-up Information       Madhuri White APRN .    Specialty: Nurse Practitioner  Contact information:  [address] [place] [id number] [phone]                           Future Appointments   Date Time Provider Department Center   2/20/2024  1:00 PM Maria Ines Phillips APRN  [facility]     Test Results Pending at Discharge:    Pending Labs       Order Current Status    Hepatitis A Antibody, Total In process    Hepatitis B Core Antibody, Total In process               Austin Figueredo DO  02/05/24  14:24 EST    Time: I spent 45 minutes on this discharge activity which included: face-to-face encounter with the patient, reviewing the data in the system, coordination of the care with the nursing staff as well as consultants, documentation, and entering orders.     Dictated utilizing Dragon dictation.

## 2024-02-05 NOTE — PLAN OF CARE
Goal Outcome Evaluation:              Outcome Evaluation: VSS. Multiple complaints of chest pain during this shift MD notified, see orders.

## 2024-02-05 NOTE — PROGRESS NOTES
"    [facility]    Name:  Girish Loja Jr.   Age:  53 y.o.  Sex:  male  :  1970  MRN:  [id number]   Visit Number:  [id number]  Admission Date:  2024  Date Of Service:  24  Primary Care Physician:  Madhuri White APRN     LOS: 0 days :    Chief Complaint:      Chest pain    Subjective:    2024: Patient seen and examined bedside this morning with wife present. Reports still being short of breath and having chest pain. Cannot have hospital bed all the way back because he \"feels like he is being smothered\". When asked to localize chest pain he describes it as \"pain in my lungs\". It is reproducible on examination worse substernal but also endorses right and left pectoral discomfort. Patient becomes teary saying he still does not feel better. Says if it weren't for his family he would \"Ask for the good Lord to take me.\" Denies constipation, abdominal pain, change in urinary frequency.    Hospital Course:    53-year-old gentleman with a history of stroke, anxiety, hypertension, COPD.  Reports for the last 1 month history of recurrent episodic dyspnea, chest discomfort, high heart rate and high blood pressure.  He reports that when this occurs it starts with shortness of breath.  As his shortness of breath builds he has heart rate blood pressure and chest pain are soon to follow.  He was also recently been treated for pneumonia as well as COPD exacerbation.  Reports every time he gets fixed he goes home and then shortly after has the spells again.  He still smokes about 6 cigarettes a day.  Used to smoke more.  Patient was managed at [facility] for concern of unstable angina.  Was on a nitroglycerin drip and that was weaned off.  Was initially accepted to ICU status but once he was taken off the drip was more appropriate for telemetry.  He elects to be DO NOT RESUSCITATE DO NOT INTUBATE     Pertinent findings: WBC 17.7, creatinine 0.8, sodium 146, " "albumin 3.7, highly sensitive troponin 14.6, EKG shows sinus tachycardia with a heart rate of 108 with no obvious ischemic changes    Review of Systems:     All systems were reviewed and negative except as mentioned in subjective, assessment and plan.    Vital Signs:    Temp:  [97.6 °F (36.4 °C)-98.3 °F (36.8 °C)] 98.2 °F (36.8 °C)  Heart Rate:  [] 93  Resp:  [16-24] 18  BP: ()/(47-99) 116/74    Intake and output:    I/O last 3 completed shifts:  In: 360 [P.O.:360]  Out: 300 [Urine:300]  No intake/output data recorded.    Physical Examination:    General Appearance:  Alert and cooperative. Teary.   Head:  Atraumatic and normocephalic.   Eyes: Conjunctivae and sclerae normal, no icterus. No pallor.   Throat: No oral lesions, no thrush, oral mucosa moist.   Neck: Supple, trachea midline, no thyromegaly.   Lungs:   Coarse expiratory breath sounds. No wheezing. Nonlabored.   Heart:  Normal S1 and S2, no murmur, no gallop, no rub. No JVD.   Abdomen:   Normal bowel sounds, no masses, no organomegaly. Soft, nontender, nondistended, no rebound tenderness.   Extremities: No edema, no cyanosis, no clubbing.   Skin: Warm.   Neurologic: Alert and oriented x 3.     Laboratory results:    Results from last 7 days   Lab Units 02/05/24  0546 02/04/24  1642   SODIUM mmol/L 136 137   POTASSIUM mmol/L 4.4 3.8   CHLORIDE mmol/L 101 102   CO2 mmol/L 23.8 24.5   BUN mg/dL 20 13   CREATININE mg/dL 0.62* 0.56*   CALCIUM mg/dL 8.6 8.8   BILIRUBIN mg/dL 0.2  --    ALK PHOS U/L 67  --    ALT (SGPT) U/L 129*  --    AST (SGOT) U/L 51*  --    GLUCOSE mg/dL 100* 98     Results from last 7 days   Lab Units 02/05/24  0546   WBC 10*3/mm3 12.40*   HEMOGLOBIN g/dL 12.7*   HEMATOCRIT % 38.6   PLATELETS 10*3/mm3 296         Results from last 7 days   Lab Units 02/05/24  0546 02/05/24  0132 02/04/24  1838   HSTROP T ng/L 10 13 10         No results for input(s): \"PHART\", \"YBX6GIR\", \"PO2ART\", \"JKU1SFC\", \"BASEEXCESS\" in the last 8760 hours.   I " have reviewed the patient's laboratory results.    Radiology results:    CT Angiogram Chest Pulmonary Embolism    Result Date: 2/5/2024  FINAL REPORT TECHNIQUE: null CLINICAL HISTORY: SOB and anterior chest pain bilateral COMPARISON: null FINDINGS: CTA chest with IV contrast. MIP images also submitted for evaluation. Comparison: None Findings: The thyroid is unremarkable. Normal caliber of the thoracic aorta. Mild atherosclerosis of the thoracic aorta. The heart is normal size. Coronary atherosclerosis. The mediastinum is intact. Fluid throughout the majority of the esophagus. No pulmonary embolism. Paraseptal and centrilobular emphysematous disease. Subsegmental atelectasis involving the lung bases, no consolidation or pleural effusion. Bronchial thickening within the bilateral lungs greatest involving the lung bases may indicate reactive airways disease including bronchitis. Degenerative changes of the thoracic spine. Question mild nodularity of the periphery of the left lobe of the liver may indicate cirrhotic liver morphology.     Impression: IMPRESSION: 1. No pulmonary embolism. 2. Paraseptal and centrilobular emphysematous disease. Subsegmental atelectasis involving the lung bases, no consolidation or pleural effusion. 3. Bronchial thickening within the bilateral lungs greatest involving the lung bases may indicate reactive airways disease including bronchitis. 4. Question mild nodularity of the periphery of the left lobe of the liver may indicate cirrhotic liver morphology. 5. Fluid throughout the majority of the esophagus. 6. Coronary atherosclerosis. Authenticated and Electronically Signed by Agustin Rivera DO on 02/05/2024 04:26:52 AM   I have reviewed the patient's radiology reports.    Medication Review:     I have reviewed the patient's active and prn medications.     Problem List:      Unstable angina    Coronary artery disease involving native heart with unstable angina pectoris    COPD (chronic  obstructive pulmonary disease)    Tobacco use    Atypical chest pain      Assessment:    Unstable Angina  Coronary Artery Disease  COPD  Tobacco Use      Plan:    Unstable Angina  Coronary Artery Disease  COPD  - DAMIAN from 01/2022 showed PFO with left-to-right shunting though no crossing of bubbles. No ASD present and valsalva maneuver not performed due to sedation.  - HEMA Score 3. Currently anticoagulated with ASA and Plavix. DVT Prophylaxis with Lovenox.  - Cardiology (Dr. Medina) following. Appreciate recommendations.   - Continue bronchodilators and inhaled corticosteroids.   - Continue Amlodipine, Carvedilol, Atorvastatin. Nitroglycerin PRN.        DVT Prophylaxis: Lovenox  Code Status: DNR/DNI  Diet: NPO  Discharge Plan: DAVID Cotton, Medical Student  02/05/24  09:17 EST    Dictated utilizing Dragon dictation.

## 2024-02-05 NOTE — PAYOR COMM NOTE
"TO:WELLCARE  FROM:VLAD LEES, RN PHONE [phone]  CLINICALS    Girish Birmingham Jr. (53 y.o. Male)       Date of Birth   1970    Social Security Number       Address   [address] [place] [address]    Home Phone   [phone]    MRN   [id number]       Sikhism   Non-Hoahaoism    Marital Status                               Admission Date   24    Admission Type   Elective    Admitting Provider   Austin Figueredo DO    Attending Provider   Austin Figueredo DO    Department, Room/Bed   [facility] TELEMETRY 3 305/1       Discharge Date       Discharge Disposition       Discharge Destination                                 Attending Provider: Austin Figueredo DO    Allergies: Penicillins    Isolation: None   Infection: None   Code Status: No CPR    Ht: 167.6 cm (65.98\")   Wt: 90.1 kg (198 lb 10.2 oz)    Admission Cmt: None   Principal Problem: Unstable angina [I20.0]                   Active Insurance as of 2024       Primary Coverage       Payor Plan Insurance Group Employer/Plan Group    [employer] [place] WELL[employer] MEDICAID        Payor Plan Address Payor Plan Phone Number Payor Plan Fax Number Effective Dates    [address] [address] [phone]  2016 - None Entered    [facility] [id number]         Subscriber Name Subscriber Birth Date Member ID       GIRISH BIRMINGHAM JR. 1970 [id number]                     Emergency Contacts        (Rel.) Home Phone Work Phone Mobile Phone    MYA BIRMINGHAM (Spouse) [phone] -- --                 History & Physical        Austin Figueredo DO at 24 [employer]            [facility] HOSPITALIST   HISTORY AND PHYSICAL      Name:  Girish Birmingham Jr.   Age:  53 y.o.  Sex:  male  :  1970  MRN:  [id number]   Visit Number:  [id number]  Admission Date:  2024  Date Of Service:  24  Primary Care Physician:  Madhuri White APRN    Chief Complaint:     Chest " discomfort    History Of Presenting Illness:      53-year-old gentleman with a history of stroke, anxiety, hypertension, COPD.  Reports for the last 1 month history of recurrent episodic dyspnea, chest discomfort, high heart rate and high blood pressure.  He reports that when this occurs it starts with shortness of breath.  As his shortness of breath builds he has heart rate blood pressure and chest pain are soon to follow.  He was also recently been treated for pneumonia as well as COPD exacerbation.  Reports every time he gets fixed he goes home and then shortly after has the spells again.  He still smokes.  About 6 cigarettes a day.  Used to smoke more.  Patient was managed at [facility] for concern of unstable angina.  Was on a nitroglycerin drip and that was weaned off.  Was initially accepted to ICU status but once he was taken off the drip was more appropriate for telemetry.  He elects to be DO NOT RESUSCITATE DO NOT INTUBATE    Pertinent findings: WBC 17.7, creatinine 0.8, sodium 146, albumin 3.7, highly sensitive troponin 14.6, EKG shows sinus tachycardia with a heart rate of 108 with no obvious ischemic changes    ED Medications:    Medications - No data to display    Edited by: Austin Figueredo DO at 2/4/2024 7340     Review Of Systems:    All systems were reviewed and negative except as mentioned in history of presenting illness, assessment and plan.    Past Medical History: Patient  has a past medical history of Anxiety, CTS (carpal tunnel syndrome), Hypertension, and Seizures.    Past Surgical History: Patient  has a past surgical history that includes Abscess drainage and ERCP with stent placement.    Social History: Patient  reports that he has been smoking cigarettes. He has a 8.75 pack-year smoking history. He has been exposed to tobacco smoke. He has never used smokeless tobacco. He reports that he does not drink alcohol and does not use drugs.    Family History:  Patient's family history  has been reviewed and found to be noncontributory.     Allergies:      Penicillins    Home Medications:    Prior to Admission Medications       Prescriptions Last Dose Informant Patient Reported? Taking?    albuterol sulfate  (90 Base) MCG/ACT inhaler   No No    Inhale 2 puffs Every 4 (Four) Hours As Needed for Wheezing.    ALPRAZolam (XANAX) 1 MG tablet   Yes No    Take 1 mg by mouth 2 (Two) Times a Day.    amLODIPine (NORVASC) 10 MG tablet   Yes No    Take 10 mg by mouth Daily.    clopidogrel (PLAVIX) 75 MG tablet   No No    Take 1 tablet by mouth Daily.    gabapentin (NEURONTIN) 600 MG tablet   Yes No    Take 600 mg by mouth 3 (Three) Times a Day.    HYDROcodone-acetaminophen (NORCO)  MG per tablet   Yes No    Take 1 tablet by mouth Every 6 (Six) Hours As Needed for Moderate Pain .    meclizine (ANTIVERT) 25 MG tablet   Yes No    Take 25 mg by mouth 3 (Three) Times a Day As Needed.    nitroglycerin (NITROSTAT) 0.4 MG SL tablet   Yes No    pantoprazole (PROTONIX) 40 MG EC tablet   No No    Take 1 tablet by mouth Every Morning.    phenytoin ER (DILANTIN) 100 MG capsule   Yes No    Take  by mouth 2 (Two) Times a Day.    pravastatin (Pravachol) 40 MG tablet   No No    Take 2 tablets by mouth Daily.    valsartan-hydrochlorothiazide (DIOVAN-HCT) 320-12.5 MG per tablet   Yes No              Vital Signs:  Temp:  [97.9 °F (36.6 °C)] 97.9 °F (36.6 °C)  Heart Rate:  [90] 90  Resp:  [18] 18  BP: (132)/(93) 132/93        02/04/24  1545   Weight: 91.6 kg (201 lb 15.1 oz)     Body mass index is 32.59 kg/m².    Physical Exam:     Most recent vital Signs: /93 (BP Location: Right arm, Patient Position: Sitting)   Pulse 90   Temp 97.9 °F (36.6 °C) (Oral)   Resp 18   Wt 91.6 kg (201 lb 15.1 oz)   SpO2 95%   BMI 32.59 kg/m²     Constitutional: Awake, alert  Eyes: PERRLA, sclerae anicteric, no conjunctival injection  HENT: NCAT, mucous membranes moist  Neck: Supple, no thyromegaly, no lymphadenopathy, trachea  "midline  Respiratory: Coarse expiratory breath sounds without wheeze bilaterally, nonlabored respirations   Cardiovascular: RRR, no murmurs, rubs, or gallops, palpable pedal pulses bilaterally  Gastrointestinal: Positive bowel sounds, soft, nontender, nondistended  Musculoskeletal: No bilateral ankle edema, no clubbing or cyanosis to extremities  Psychiatric: Appropriate affect, cooperative  Neurologic: Oriented x 3, speech clear  Skin: No rashes  Edited by: Austin Figueredo DO at 2/4/2024 9174      Laboratory data:    I have reviewed the labs done in the emergency room.          Invalid input(s): \"LABALBU\", \"PROT\"                                    Invalid input(s): \"USDES\", \"NITRITITE\", \"BACT\", \"EP\"    Pain Management Panel  More data may exist         Latest Ref Rng & Units 1/20/2022 1/18/2022   Pain Management Panel   Amphetamine, Urine Qual Negative Negative  Negative    Barbiturates Screen, Urine Negative Negative  Negative    Benzodiazepine Screen, Urine Negative Negative  Negative    Buprenorphine, Screen, Urine Negative Negative  Negative    Cocaine Screen, Urine Negative Negative  Negative    Methadone Screen , Urine Negative Negative  Negative    Methamphetamine, Ur Negative Negative  Negative        EKG:      Sinus tachycardia without obvious ischemic changes    Radiology:    No radiology results for the last 3 days    Assessment/Plan:      Coronary artery disease involving native heart with unstable angina pectoris    COPD (chronic obstructive pulmonary disease)    Tobacco use    -Observation telemetry, consult to Dr. Medina.  Continue bronchodilators inhaled corticosteroids.  Aspirin, Plavix statin.  Treatment dose Lovenox.  14 mg nicotine patch.  Edited by: Austin Figueredo DO at 2/4/2024 4300           Risk Assessment: moderate   DVT Prophylaxis: lovenox  Code Status:   There are no questions and answers to display.      Diet:   Dietary Orders (From admission, onward)       Start     " Ordered    02/05/24 0001  NPO Diet NPO Type: Strict NPO  Diet Effective Midnight        Question:  NPO Type  Answer:  Strict NPO    02/04/24 1632    02/04/24 1631  Diet: Cardiac Diets; Healthy Heart (2-3 Na+); Texture: Regular Texture (IDDSI 7); Fluid Consistency: Thin (IDDSI 0)  Diet Effective Now        References:    Diet Order [place]   Question Answer Comment   Diets: Cardiac Diets    Cardiac Diet: Healthy Heart (2-3 Na+)    Texture: Regular Texture (IDDSI 7)    Fluid Consistency: Thin (IDDSI 0)        02/04/24 1632                     Advance Care Planning  ACP discussion was held with the patient during this visit. Patient does not have an advance directive, declines further assistance.           Austin Figueredo DO  02/04/24  16:33 EST    Dictated utilizing Dragon dictation.      Electronically signed by Austin Figueredo DO at 02/04/24 1635       Vital Signs (last day)       Date/Time Temp Temp src Pulse Resp BP Patient Position SpO2    02/05/24 0720 -- -- -- 18 -- Lying --    02/05/24 0708 98.2 (36.8) Oral 93 24 116/74 Lying 97    02/05/24 0534 -- -- 89 20 -- -- 94    02/05/24 0516 -- -- 99 20 141/98 Lying 97    02/05/24 0332 98.2 (36.8) Oral 95 18 99/71 Lying 94    02/05/24 0231 -- -- 92 20 -- -- 94    02/05/24 0224 -- -- 103 -- 128/72 Lying --    02/05/24 0133 -- -- -- -- 93/55 Lying --    02/05/24 0128 -- -- -- -- 89/47 -- --    02/05/24 0125 -- -- 104 -- -- -- --    02/05/24 0121 -- -- 107 -- -- -- --    02/05/24 0120 -- -- -- -- 118/73 Lying --    02/05/24 0113 -- -- 106 -- 111/84 Lying --    02/05/24 0112 -- -- 109 -- 107/81 Lying --    02/05/24 0057 97.6 (36.4) Oral 99 22 127/99 Lying 96    02/04/24 2242 98.3 (36.8) Oral 87 16 100/68 Lying 97    02/04/24 1959 -- -- -- 18 -- -- 93    02/04/24 1953 -- -- 95 -- 121/78 -- --    02/04/24 1848 97.8 (36.6) Axillary 99 18 133/95 -- 96    02/04/24 1545 97.9 (36.6) Oral 90 18 132/93 Sitting 95          Current Facility-Administered Medications    Medication Dose Route Frequency Provider Last Rate Last Admin    acetaminophen (TYLENOL) tablet 650 mg  650 mg Oral Q4H PRN Austin Figueredo DO        albuterol (PROVENTIL) nebulizer solution 0.083% 2.5 mg/3mL  2.5 mg Nebulization Q6H - RT Austin Figueredo, DO   2.5 mg at 02/05/24 0720    ALPRAZolam (XANAX) tablet 1 mg  1 mg Oral TID Austin Figueredo DO   1 mg at 02/04/24 2111    aluminum-magnesium hydroxide-simethicone (MAALOX MAX) 400-400-40 MG/5ML suspension 10 mL  10 mL Oral Q6H PRN Bertrand Miramontes MD   10 mL at 02/05/24 0131    amLODIPine (NORVASC) tablet 10 mg  10 mg Oral Daily Austni Figueredo DO        aspirin EC tablet 81 mg  81 mg Oral Daily Austin Figueredo DO        atorvastatin (LIPITOR) tablet 80 mg  80 mg Oral Nightly Austin Figueredo DO   80 mg at 02/04/24 2111    sennosides-docusate (PERICOLACE) 8.6-50 MG per tablet 2 tablet  2 tablet Oral BID Austin Figueredo DO        And    polyethylene glycol (MIRALAX) packet 17 g  17 g Oral Daily PRN Austin Figueredo DO        And    bisacodyl (DULCOLAX) EC tablet 5 mg  5 mg Oral Daily PRN Austin Figueredo DO        And    bisacodyl (DULCOLAX) suppository 10 mg  10 mg Rectal Daily PRN Austin Figueredo DO        budesonide (PULMICORT) nebulizer solution 1 mg  1 mg Nebulization BID - RT Austin Figueredo, DO   1 mg at 02/05/24 0720    calcium carbonate (TUMS) chewable tablet 500 mg (200 mg elemental)  2 tablet Oral BID PRN Austin Figueredo DO        Calcium Replacement - Follow Nurse / BPA Driven Protocol   Does not apply PRN Austin Figueredo DO        carvedilol (COREG) tablet 6.25 mg  6.25 mg Oral BID With Meals Bertrand Miramontes MD        clopidogrel (PLAVIX) tablet 75 mg  75 mg Oral Daily Austin Figueredo DO        Enoxaparin Sodium (LOVENOX) syringe 90 mg  1 mg/kg Subcutaneous Q12H Austin Figueredo DO   90 mg at 02/05/24 0531    gabapentin (NEURONTIN) capsule 800 mg  800 mg Oral Q8H Austin Figueredo, DO    800 mg at 02/04/24 2111    valsartan (DIOVAN) tablet 320 mg  320 mg Oral Q24H Austin Figueredo, DO   320 mg at 02/04/24 1707    And    hydroCHLOROthiazide (HYDRODIURIL) oral 12.5 mg  12.5 mg Oral Q24H Austin Figueredo, DO        HYDROcodone-acetaminophen (NORCO)  MG per tablet 1 tablet  1 tablet Oral Q6H PRN Austin Figueredo DO   1 tablet at 02/05/24 0225    ipratropium-albuterol (DUO-NEB) nebulizer solution 3 mL  3 mL Nebulization Q4H PRN Bertrand Miramontes MD   3 mL at 02/05/24 0534    Magnesium Standard Dose Replacement - Follow Nurse / BPA Driven Protocol   Does not apply PRN Austin Figueredo DO        meclizine (ANTIVERT) tablet 25 mg  25 mg Oral TID PRN Austin Figueredo DO        melatonin tablet 5 mg  5 mg Oral Nightly Austin Figueredo DO   5 mg at 02/04/24 2111    Morphine sulfate (PF) injection 2 mg  2 mg Intravenous Q2H PRN Bertrand Miramontes MD   2 mg at 02/05/24 0842    nicotine (NICODERM CQ) 14 MG/24HR patch 1 patch  1 patch Transdermal Q24H Lisa Waters MD   1 patch at 02/04/24 2249    nicotine polacrilex (NICORETTE) gum 2 mg  2 mg Mouth/Throat Q1H PRN Austin Figueredo DO        nitroglycerin (NITROSTAT) SL tablet 0.4 mg  0.4 mg Sublingual Q5 Min PRN Austin Figueredo DO   0.4 mg at 02/05/24 0121    ondansetron ODT (ZOFRAN-ODT) disintegrating tablet 4 mg  4 mg Oral Q6H PRN Austin Figueredo DO        Or    ondansetron (ZOFRAN) injection 4 mg  4 mg Intravenous Q6H PRN Austin Figueredo DO        pantoprazole (PROTONIX) injection 40 mg  40 mg Intravenous Q AM Bertrand Miramontes MD        phenytoin ER (DILANTIN) capsule 100 mg  100 mg Oral BID Asutin Figueredo, DO   100 mg at 02/04/24 2111    Phosphorus Replacement - Follow Nurse / BPA Driven Protocol   Does not apply PRN Austin Figueredo,         Potassium Replacement - Follow Nurse / BPA Driven Protocol   Does not apply PRN Austin Figueredo,         sodium chloride 0.9 % infusion  70 mL/hr Intravenous  Continuous Roberta Encarnacion MD         Lab Results (last 24 hours)       Procedure Component Value Units Date/Time    POC Glucose Once [[id number]]  (Normal) Collected: 02/05/24 0840    Specimen: Blood Updated: 02/05/24 0842     Glucose 109 mg/dL      Comment: Serial Number: [id number]:  [id number]       Comprehensive Metabolic Panel [[id number]]  (Abnormal) Collected: 02/05/24 0546    Specimen: Blood Updated: 02/05/24 0625     Glucose 100 mg/dL      BUN 20 mg/dL      Creatinine 0.62 mg/dL      Sodium 136 mmol/L      Potassium 4.4 mmol/L      Chloride 101 mmol/L      CO2 23.8 mmol/L      Calcium 8.6 mg/dL      Total Protein 6.3 g/dL      Albumin 3.8 g/dL      ALT (SGPT) 129 U/L      AST (SGOT) 51 U/L      Alkaline Phosphatase 67 U/L      Total Bilirubin 0.2 mg/dL      Globulin 2.5 gm/dL      A/G Ratio 1.5 g/dL      BUN/Creatinine Ratio 32.3     Anion Gap 11.2 mmol/L      eGFR 115.7 mL/min/1.73     Narrative:      GFR Normal >60  Chronic Kidney Disease <60  Kidney Failure <15      High Sensitivity Troponin T [[id number]]  (Normal) Collected: 02/05/24 0546    Specimen: Blood Updated: 02/05/24 0623     HS Troponin T 10 ng/L     Narrative:      High Sensitive Troponin T Reference Range:  <14.0 ng/L- Negative Female for AMI  <22.0 ng/L- Negative Male for AMI  >=14 - Abnormal Female indicating possible myocardial injury.  >=22 - Abnormal Male indicating possible myocardial injury.   Clinicians would have to utilize clinical acumen, EKG, Troponin, and serial changes to determine if it is an Acute Myocardial Infarction or myocardial injury due to an underlying chronic condition.         CBC (No Diff) [[id number]]  (Abnormal) Collected: 02/05/24 0546    Specimen: Blood Updated: 02/05/24 0559     WBC 12.40 10*3/mm3      RBC 4.12 10*6/mm3      Hemoglobin 12.7 g/dL      Hematocrit 38.6 %      MCV 93.7 fL      MCH 30.8 pg      MCHC 32.9 g/dL      RDW 14.6 %      RDW-SD 49.9 fl      MPV 10.7 fL      Platelets 296  10*3/mm3     High Sensitivity Troponin T [[id number]]  (Normal) Collected: 02/05/24 0132    Specimen: Blood Updated: 02/05/24 0156     HS Troponin T 13 ng/L     Narrative:      High Sensitive Troponin T Reference Range:  <14.0 ng/L- Negative Female for AMI  <22.0 ng/L- Negative Male for AMI  >=14 - Abnormal Female indicating possible myocardial injury.  >=22 - Abnormal Male indicating possible myocardial injury.   Clinicians would have to utilize clinical acumen, EKG, Troponin, and serial changes to determine if it is an Acute Myocardial Infarction or myocardial injury due to an underlying chronic condition.         High Sensitivity Troponin T 2Hr [[id number]]  (Normal) Collected: 02/04/24 1838    Specimen: Blood Updated: 02/04/24 1911     HS Troponin T 10 ng/L      Troponin T Delta 1 ng/L     Narrative:      High Sensitive Troponin T Reference Range:  <14.0 ng/L- Negative Female for AMI  <22.0 ng/L- Negative Male for AMI  >=14 - Abnormal Female indicating possible myocardial injury.  >=22 - Abnormal Male indicating possible myocardial injury.   Clinicians would have to utilize clinical acumen, EKG, Troponin, and serial changes to determine if it is an Acute Myocardial Infarction or myocardial injury due to an underlying chronic condition.         High Sensitivity Troponin T [[id number]]  (Normal) Collected: 02/04/24 1642    Specimen: Blood Updated: 02/04/24 1712     HS Troponin T 9 ng/L     Narrative:      High Sensitive Troponin T Reference Range:  <14.0 ng/L- Negative Female for AMI  <22.0 ng/L- Negative Male for AMI  >=14 - Abnormal Female indicating possible myocardial injury.  >=22 - Abnormal Male indicating possible myocardial injury.   Clinicians would have to utilize clinical acumen, EKG, Troponin, and serial changes to determine if it is an Acute Myocardial Infarction or myocardial injury due to an underlying chronic condition.         Basic Metabolic Panel [[id number]]  (Abnormal) Collected: 02/04/24 1642     Specimen: Blood Updated: 02/04/24 1711     Glucose 98 mg/dL      BUN 13 mg/dL      Creatinine 0.56 mg/dL      Sodium 137 mmol/L      Potassium 3.8 mmol/L      Chloride 102 mmol/L      CO2 24.5 mmol/L      Calcium 8.8 mg/dL      BUN/Creatinine Ratio 23.2     Anion Gap 10.5 mmol/L      eGFR 119.3 mL/min/1.73     Narrative:      GFR Normal >60  Chronic Kidney Disease <60  Kidney Failure <15            Imaging Results (Last 24 Hours)       Procedure Component Value Units Date/Time    CT Angiogram Chest Pulmonary Embolism [[id number]] Collected: 02/05/24 0426     Updated: 02/05/24 0428    Narrative:      FINAL REPORT    TECHNIQUE:  null    CLINICAL HISTORY:  SOB and anterior chest pain bilateral    COMPARISON:  null    FINDINGS:  CTA chest with IV contrast. MIP images also submitted for evaluation.    Comparison: None    Findings:    The thyroid is unremarkable.    Normal caliber of the thoracic aorta. Mild atherosclerosis of the thoracic aorta.    The heart is normal size. Coronary atherosclerosis.    The mediastinum is intact.    Fluid throughout the majority of the esophagus.    No pulmonary embolism.    Paraseptal and centrilobular emphysematous disease. Subsegmental atelectasis involving the lung bases, no consolidation or pleural effusion.    Bronchial thickening within the bilateral lungs greatest involving the lung bases may indicate reactive airways disease including bronchitis.    Degenerative changes of the thoracic spine.    Question mild nodularity of the periphery of the left lobe of the liver may indicate cirrhotic liver morphology.      Impression:      IMPRESSION:    1. No pulmonary embolism.    2. Paraseptal and centrilobular emphysematous disease. Subsegmental atelectasis involving the lung bases, no consolidation or pleural effusion.    3. Bronchial thickening within the bilateral lungs greatest involving the lung bases may indicate reactive airways disease including bronchitis.    4. Question mild  nodularity of the periphery of the left lobe of the liver may indicate cirrhotic liver morphology.    5. Fluid throughout the majority of the esophagus.    6. Coronary atherosclerosis.    Authenticated and Electronically Signed by Agustin Rivera DO on  02/05/2024 04:26:52 AM          ECG/EMG Results (last 24 hours)       Procedure Component Value Units Date/Time    SCANNED - TELEMETRY   [[id number]] Resulted: 02/04/24     Updated: 02/04/24 2009    SCANNED - TELEMETRY   [[id number]] Resulted: 02/04/24     Updated: 02/04/24 2204    ECG 12 Lead Chest Pain [[id number]] Collected: 02/04/24 1752     Updated: 02/05/24 0818     QT Interval 358 ms      QTC Interval 433 ms     Narrative:      Test Reason : Chest Pain  Blood Pressure :   */*   mmHG  Vent. Rate :  88 BPM     Atrial Rate :  88 BPM     P-R Int : 142 ms          QRS Dur :  78 ms      QT Int : 358 ms       P-R-T Axes :  67  68  68 degrees     QTc Int : 433 ms    Normal sinus rhythm  Normal ECG  When compared with ECG of 27-DEC-2022 20:01,  Minimal criteria for Anterior infarct are no longer present    Referred By:            Confirmed By:     ECG 12 Lead Chest Pain [[id number]] Collected: 02/05/24 0153     Updated: 02/05/24 0818     QT Interval 344 ms      QTC Interval 443 ms     Narrative:      Test Reason : Chest Pain  Blood Pressure :   */*   mmHG  Vent. Rate : 100 BPM     Atrial Rate : 100 BPM     P-R Int : 118 ms          QRS Dur :  78 ms      QT Int : 344 ms       P-R-T Axes :  63  56  44 degrees     QTc Int : 443 ms    Normal sinus rhythm  Possible Left atrial enlargement  Cannot rule out Anterior infarct , age undetermined  Abnormal ECG  When compared with ECG of 04-FEB-2024 20:12, (Unconfirmed)  Nonspecific T wave abnormality now evident in Inferior leads    Referred By:            Confirmed By:           Physician Progress Notes (last 24 hours)  Notes from 02/04/24 0853 through 02/05/24 0853   No notes of this type exist for this encounter.       Consult Notes  (last 24 hours)  Notes from 02/04/24 0853 through 02/05/24 0853   No notes of this type exist for this encounter.

## 2024-02-05 NOTE — CASE MANAGEMENT/SOCIAL WORK
"Continued Stay Note  [facility]     Patient Name: Girish Loja Jr.  MRN: [id number]  Today's Date: 2/5/2024    Admit Date: 2/4/2024    Plan: informed md that pt would like a BSC and Walker with wheels on d/c; called [facility] [phone] to confirm a patient; confirmed and stated to fax orders to [phone]   Discharge Plan       Row Name 02/05/24 1552       Plan    Plan informed md that pt would like a BSC and Walker with wheels on d/c; called [facility] [phone] to confirm a patient; confirmed and stated to fax orders to [phone]      Row Name 02/05/24 1414       Plan    Plan Comments Pt awake and alert at time of visit.  Pt's spouse at bedside.  Pt agreeable to spouse staying during conversation.  Pt confirmed address however mentioned address as in [place], [place] instead of [place]. Pt reports he does not have a POA or Living Will.  He lives in a mobile home with his wife and 14 yr old grandson that he is raising.    He reports has O2 which he wears @ 2L continuously, a straight cane and Hospital bed obtained through [facility] in [facility].  He added that he has a wheelchair coming .   When asked he reported needing a walker and BSC.    He is being followed by [facility] --nursing.  Pt reports he is dependent on most of ADLs.  His spouse asssits wtih bathing and medications.  Pt reports he has to \"stop to rest\" whenevery going up stairs due to SOA.  Explained Meds to Beds program.  Pt agreeable to using.  He plans to return home at DC and reports his brother will drive him home.  When asked, pt does have a portable O2 tank in his car.   Informed if any other DC needs arise, CM will attempt to assist.                      Tania Anglin RN    "

## 2024-02-05 NOTE — ANESTHESIA POSTPROCEDURE EVALUATION
Patient: Girish Loja Jr.    Procedure Summary       Date: 02/05/24 Room / Location: [facility] ENDOSCOPY 2 / [facility] ENDOSCOPY    Anesthesia Start: 1051 Anesthesia Stop: 1113    Procedure: ESOPHAGOGASTRODUODENOSCOPY WITH BRUSHING (Esophagus) Diagnosis:       Atypical chest pain      (Atypical chest pain [R07.89])    Surgeons: Roberta Chaparro MD Provider: Ken Berrios CRNA    Anesthesia Type: MAC ASA Status: 3            Anesthesia Type: MAC    Vitals  No vitals data found for the desired time range.          Post Anesthesia Care and Evaluation    Patient location during evaluation: PACU  Patient participation: complete - patient participated  Level of consciousness: awake  Pain score: 0  Pain management: adequate    Airway patency: patent  Anesthetic complications: No anesthetic complications  PONV Status: controlled  Cardiovascular status: acceptable and stable  Respiratory status: acceptable and room air  Hydration status: acceptable    Comments: Vital signs as noted in nursing documentation as per protocol
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
## 2024-02-05 NOTE — PLAN OF CARE
Goal Outcome Evaluation:  Plan of Care Reviewed With: patient, spouse, family   Left floor to go to pharmacy and notified unit secretary.  Had been advised to wait for med delivery and wheelchair transport.

## 2024-02-05 NOTE — CASE MANAGEMENT/SOCIAL WORK
Discharge Planning Assessment   Jim     Patient Name: Girish Loja Jr.  MRN: [id number]  Today's Date: 2/5/2024    Admit Date: 2/4/2024        Discharge Needs Assessment       Row Name 02/05/24 1356       Living Environment    People in Home grandchild(jose);spouse    Name(s) of People in Home Meenu Loja/spouse and 14yr old grandson    Current Living Arrangements other (see comments)  Mobile home    Duration at Residence Reports has lived in mobile home x 5 years    Potentially Unsafe Housing Conditions none    In the past 12 months has the electric, gas, oil, or water company threatened to shut off services in your home? Yes  Reported turned utility off and pt had to pay to have it turned back on.  Unclear if this year.    Primary Care Provided by spouse/significant other    Provides Primary Care For no one, unable/limited ability to care for self    Family Caregiver if Needed spouse    Quality of Family Relationships supportive    Able to Return to Prior Arrangements yes    Living Arrangement Comments Pt lives with spouse and grandson that he is raising.  They live in mobile home.       Resource/Environmental Concerns    Resource/Environmental Concerns none       Transportation Needs    In the past 12 months, has lack of transportation kept you from medical appointments or from getting medications? no  He reported missing an appointment due to weather    In the past 12 months, has lack of transportation kept you from meetings, work, or from getting things needed for daily living? No       Food Insecurity    Within the past 12 months, you worried that your food would run out before you got the money to buy more. Sometimes    Within the past 12 months, the food you bought just didn't last and you didn't have money to get more. Never true       Transition Planning    Patient/Family Anticipates Transition to home with family    Patient/Family Anticipated Services at Transition complementary therapies;other  "(see comments)  Pt would like to obtain a walker and BSC       Discharge Needs Assessment    Readmission Within the Last 30 Days no previous admission in last 30 days    Equipment Currently Used at Home oxygen;hospital bed    Concerns to be Addressed adjustment to diagnosis/illness;other (see comments)    Concerns Comments Pt would like to obtain a walker and BSC.  Obtained Hospital bed from [facility]    Anticipated Changes Related to Illness inability to care for self    Equipment Needed After Discharge walker, rolling;commode    Provided Post Acute Provider List? --  Pt established with [facility] in [place], [place]    Current Discharge Risk chronically ill;dependent with mobility/activities of daily living                   Discharge Plan       Row Name 02/05/24 1414       Plan    Plan Comments Pt awake and alert at time of visit.  Pt's spouse at bedside.  Pt agreeable to spouse staying during conversation.  Pt confirmed address however mentioned address as in [place], [place] instead of [place]. Pt reports he does not have a POA or Living Will.  He lives in a mobile home with his wife and 14 yr old grandson that he is raising.    He reports has O2 which he wears @ 2L continuously, a straight cane and Hospital bed obtained through [facility] in [facility].  He added that he has a wheelchair coming .   When asked he reported needing a walker and BSC.    He is being followed by [facility]--nursing.  Pt reports he is dependent on most of ADLs.  His spouse asssits wtih bathing and medications.  Pt reports he has to \"stop to rest\" whenevery going up stairs due to SOA.  Explained Meds to Beds program.  Pt agreeable to using.  He plans to return home at DC and reports his brother will drive him home.  When asked, pt does have a portable O2 tank in his car.   Informed if any other DC needs arise, CM will attempt to assist.                  Continued Care and Services - Admitted Since 2/4/2024 "    Coordination has not been started for this encounter.       Expected Discharge Date and Time       Expected Discharge Date Expected Discharge Time    Feb 5, 2024            Demographic Summary       Row Name 02/05/24 1353       General Information    Admission Type observation    Arrived From home    Expected Length of Stay (LOS) 48hr    Referral Source admission list    Reason for Consult discharge planning    Preferred Language English       Contact Information    Permission Granted to Share Info With ;family/designee    Contact Information Obtained for     Contact Information Comments Meenu Loja/spouse/[phone]                   Functional Status       Row Name 02/05/24 1354       Functional Status    Usual Activity Tolerance poor    Current Activity Tolerance poor    Functional Status Comments Reports has to rest while walking up steps before completes stairs       Physical Activity    On average, how many days per week do you engage in moderate to strenuous exercise (like a brisk walk)? 0 days  Reports unable to exercise    On average, how many minutes do you engage in exercise at this level? 0 min    Number of minutes of exercise per week 0       Functional Status, IADL    Medications assistive person    Meal Preparation completely dependent    Housekeeping completely dependent    Laundry completely dependent    Shopping completely dependent    IADL Comments Dependent of ADLs.  Spouse assists with personal care and meds       Mental Status Summary    Recent Changes in Mental Status/Cognitive Functioning no changes                   Psychosocial    No documentation.                  Abuse/Neglect    No documentation.                  Legal    No documentation.                  Substance Abuse    No documentation.                  Patient Forms    No documentation.                     Clarisse Sultana RN

## 2024-02-06 LAB — HAV AB SER QL IA: POSITIVE

## 2024-02-06 NOTE — CASE MANAGEMENT/SOCIAL WORK
Case Management Discharge Note           Provided Post Acute Provider List?:  (Pt established with [facility] in [place], [place])    Selected Continued Care - Discharged on 2/5/2024 Admission date: 2/4/2024 - Discharge disposition: Home or Self Care      Destination    No services have been selected for the patient.                Durable Medical Equipment Coordination complete.      Service Provider Selected Services Address Phone Fax Patient Preferred    [facility] [address] [place] [id number] [phone] --                 Transportation Services  Private: Car    Final Discharge Disposition Code: 01 - home or self-care

## 2024-02-06 NOTE — DISCHARGE PLACEMENT REQUEST
Orders for walker and BSC  ; [facility] [phone] and fax [phone]      Miscellaneous DME [[id number]] (Order [id number])  Order  Date: 2/5/2024 Department: [facility] 3 Ordering/Authorizing: Austin Figueredo DO     Order History  Outpatient  Date/Time Action Taken User Additional Information   02/05/24 1611 Sign Austin Figueredo DO      Order Details    Frequency Duration Priority Order Class   None None Routine External     Start Date/Time    Start Date   02/05/24     Order Information    Order Date Service Start Date Start Time   02/05/24 Medicine 02/05/24      Reference Links    Associated Reports   View Encounter     Order Questions    Question Answer   Type of DME bedside commode,  Front wheel walker   Length of Need 99 Months = Lifetime            Source Order Set / Preference List    Preference List   AMB SUPPLIES [1416]           Clinical Indications     ICD-10-CM ICD-9-CM   Atypical chest pain  - Primary    R07.89 786.59   Chronic obstructive pulmonary disease with acute exacerbation    J44.1 491.21   Chronic obstructive pulmonary disease, unspecified COPD type    J44.9 496                             Reprint Order Requisition    Miscellaneous DME (Order #[id number]) on 2/5/24         Encounter    View Encounter                Order Provider Info        Office phone Pager E-mail   Ordering User  Austin Figueredo DO  [phone] -- --   Authorizing Provider  Austin Figueredo DO  [phone] -- --   Attending Provider When Ordered  Austin Figueredo DO  [phone] -- --     Tracking Reports    Cosign Tracking Order Transmittal Tracking     Authorized by:  Austin Figueredo DO  (NPI: [id number])                Lab Component SmartPhrase Girish Acosta Jr. (53 y.o. Male)       Date of Birth   1970    Social Security Number   [id number]    Address   [address] [place] [address]    Home Phone   [phone]     "MRN   [id number]       Judaism   Non-Caodaism    Marital Status                               Admission Date   24    Admission Type   Elective    Admitting Provider   Austin Figueredo DO    Attending Provider       Department, Room/Bed   [facility] TELEMETRY 3, 305/       Discharge Date   2024    Discharge Disposition   Home or Self Care    Discharge Destination                                 Attending Provider: (none)   Allergies: Penicillins    Isolation: None   Infection: None   Code Status: Prior    Ht: 167.6 cm (65.98\")   Wt: 90.1 kg (198 lb 10.2 oz)    Admission Cmt: None   Principal Problem: Unstable angina [I20.0]                   Active Insurance as of 2024       Primary Coverage       Payor Plan Insurance Group Employer/Plan Group    [employer] [place] [employer]ICA[employer]        Payor Plan Address Payor Plan Phone Number Payor Plan Fax Number Effective Dates    [address] [address] [phone]  2016 - None Entered    [facility] [id number]         Subscriber Name Subscriber Birth Date Member ID       GIRISH BIRMINGHAM JR. 1970 [id number]                     Emergency Contacts        (Rel.) Home Phone Work Phone Mobile Phone    MYA BIRMINGHAM (Spouse) [phone] -- [phone]    Ford Mcfadden (Brother) [phone] -- --              Physician Progress Notes (last 24 hours)  Notes from 24 0728 through 24 0728   No notes of this type exist for this encounter.          Discharge Summary        Austin Figueredo DO at 24 1424              [facility] HOSPITALIST   DISCHARGE SUMMARY      Name:  Girish Birmingham Jr.   Age:  53 y.o.  Sex:  male  :  1970  MRN:  [id number]   Visit Number:  [id number]    Admission Date:  2024  Date of Discharge:  2024  Primary Care Physician:  Madhuri White, APRN    Important issues to note:    Start: Diflucan, Trelegy, nicotine patch, Carafate  Stop: Nothing  Follow up: PCP and " pulmonology and gastroenterology  Brief Summary: Presented with chest pain due to unclear cause suspect combination of GI and respiratory pathology being treated with PPIs Carafate and bronchodilators and inhaled corticosteroids.  Will have close follow-up with GI and pulmonary outpatient.  Chest pain thought noncardiac.    DME statements:  The patient is physically incapable of utilizing regular toilet facilities. Use of a bedside commode is necessary as they are confined to one level of home  with no toileting facilities available on that level.  Patient has a mobility limitation that significantly impairs their ability to participate in one or more mobility- related activities of daily living. The patient is able to safely use the walker. The functional mobility deficit can be sufficiently resolved by use of a walker.     Discharge Diagnoses:       Unstable angina    Coronary artery disease involving native heart with unstable angina pectoris    COPD (chronic obstructive pulmonary disease)    Tobacco use    Atypical chest pain        Problem List:     Active Hospital Problems    Diagnosis  POA    **Unstable angina [I20.0]  Yes    Coronary artery disease involving native heart with unstable angina pectoris [I25.110]  Yes    COPD (chronic obstructive pulmonary disease) [J44.9]  Yes    Tobacco use [Z72.0]  Yes    Atypical chest pain [R07.89]  Unknown      Resolved Hospital Problems   No resolved problems to display.     Presenting Problem:    No chief complaint on file.     Consults:     Consulting Physician(s)         Provider   Role Specialty     Herbie Bowers MD  Consulting Physician Cardiology     Nila Clark MD  Consulting Physician Pulmonary Disease     Roberta Chaparro MD  Consulting Physician Gastroenterology          Procedures Performed:    Procedure(s):  ESOPHAGOGASTRODUODENOSCOPY WITH BRUSHING      History of presenting illness:    53-year-old gentleman with a history of stroke,  anxiety, hypertension, COPD.  Reports for the last 1 month history of recurrent episodic dyspnea, chest discomfort, high heart rate and high blood pressure.  He reports that when this occurs it starts with shortness of breath.  As his shortness of breath builds he has heart rate blood pressure and chest pain are soon to follow.  He was also recently been treated for pneumonia as well as COPD exacerbation.  Reports every time he gets fixed he goes home and then shortly after has the spells again.  He still smokes.  About 6 cigarettes a day.  Used to smoke more.  Patient was managed at [facility] for concern of unstable angina.  Was on a nitroglycerin drip and that was weaned off.  Was initially accepted to ICU status but once he was taken off the drip was more appropriate for telemetry.  He elects to be DO NOT RESUSCITATE DO NOT INTUBATE    Pertinent findings: WBC 17.7, creatinine 0.8, sodium 146, albumin 3.7, highly sensitive troponin 14.6, EKG shows sinus tachycardia with a heart rate of 108 with no obvious ischemic changes    Hospital course:     Coronary artery disease involving native heart with unstable angina pectoris, ruled out    Candida esophagitis    COPD (chronic obstructive pulmonary disease)    Tobacco use    -Gastroenterology examined the patient EGD suggested candidal esophagitis. Continued on antifungals with Diflucan and PPIs.  Cardiology examined the patient and felt was noncardiac chest pain.   Pulmonology examined the patient and made recommendations for home regimen and follow-up for COPD.  Continued on long-acting inhaled corticosteroids and bronchodilators.  Close follow-up with pulmonology and gastroenterology.  14 mg nicotine patch.        Vital Signs:    Temp:  [97.5 °F (36.4 °C)-98.3 °F (36.8 °C)] 97.5 °F (36.4 °C)  Heart Rate:  [] 95  Resp:  [16-24] 18  BP: ()/(47-99) 116/74    Physical Exam:    Constitutional: Awake, alert  Eyes: PERRLA, sclerae anicteric, no conjunctival  injection  HENT: NCAT, mucous membranes moist  Neck: Supple, no thyromegaly, no lymphadenopathy, trachea midline  Respiratory: Coarse expiratory breath sounds without wheeze bilaterally, nonlabored respirations   Cardiovascular: RRR, no murmurs, rubs, or gallops, palpable pedal pulses bilaterally  Gastrointestinal: Positive bowel sounds, soft, nontender, nondistended  Musculoskeletal: No bilateral ankle edema, no clubbing or cyanosis to extremities  Psychiatric: Appropriate affect, cooperative  Neurologic: Oriented x 3, speech clear  Skin: No rashes    Exam stable 2-5-2024    Pertinent Lab Results:     Results from last 7 days   Lab Units 02/05/24  0546 02/04/24  1642   SODIUM mmol/L 136 137   POTASSIUM mmol/L 4.4 3.8   CHLORIDE mmol/L 101 102   CO2 mmol/L 23.8 24.5   BUN mg/dL 20 13   CREATININE mg/dL 0.62* 0.56*   CALCIUM mg/dL 8.6 8.8   BILIRUBIN mg/dL 0.2  --    ALK PHOS U/L 67  --    ALT (SGPT) U/L 129*  --    AST (SGOT) U/L 51*  --    GLUCOSE mg/dL 100* 98     Results from last 7 days   Lab Units 02/05/24  0546   WBC 10*3/mm3 12.40*   HEMOGLOBIN g/dL 12.7*   HEMATOCRIT % 38.6   PLATELETS 10*3/mm3 296         Results from last 7 days   Lab Units 02/05/24  0546 02/05/24  0132 02/04/24  1838   HSTROP T ng/L 10 13 10                           Pertinent Radiology Results:    Imaging Results (All)       Procedure Component Value Units Date/Time    CT Angiogram Chest Pulmonary Embolism [[id number]] Collected: 02/05/24 0426     Updated: 02/05/24 0428    Narrative:      FINAL REPORT    TECHNIQUE:  null    CLINICAL HISTORY:  SOB and anterior chest pain bilateral    COMPARISON:  null    FINDINGS:  CTA chest with IV contrast. MIP images also submitted for evaluation.    Comparison: None    Findings:    The thyroid is unremarkable.    Normal caliber of the thoracic aorta. Mild atherosclerosis of the thoracic aorta.    The heart is normal size. Coronary atherosclerosis.    The mediastinum is intact.    Fluid throughout the  majority of the esophagus.    No pulmonary embolism.    Paraseptal and centrilobular emphysematous disease. Subsegmental atelectasis involving the lung bases, no consolidation or pleural effusion.    Bronchial thickening within the bilateral lungs greatest involving the lung bases may indicate reactive airways disease including bronchitis.    Degenerative changes of the thoracic spine.    Question mild nodularity of the periphery of the left lobe of the liver may indicate cirrhotic liver morphology.      Impression:      IMPRESSION:    1. No pulmonary embolism.    2. Paraseptal and centrilobular emphysematous disease. Subsegmental atelectasis involving the lung bases, no consolidation or pleural effusion.    3. Bronchial thickening within the bilateral lungs greatest involving the lung bases may indicate reactive airways disease including bronchitis.    4. Question mild nodularity of the periphery of the left lobe of the liver may indicate cirrhotic liver morphology.    5. Fluid throughout the majority of the esophagus.    6. Coronary atherosclerosis.    Authenticated and Electronically Signed by Agustin Rivera DO on  02/05/2024 04:26:52 AM            Echo:    Results for orders placed during the hospital encounter of 01/18/22    Adult Transesophageal Echo (DAMIAN) W/ Cont if Necessary Per Protocol    Interpretation Summary  · Estimated left ventricular EF = 65% Left ventricular ejection fraction appears to be 61 - 65%. Left ventricular systolic function is normal.  · Normal RV size and function  · RA and LA are normal  · Aortic and mitral valves show normal structure and motion without significant MR  · LA appendage is narrow and normal  · Saline contrast study showed negative contrast suggesting Left to Right shuntin via PFO but no crossing of bubbles. There was no ASD and valsalva maneuver could not be performed due to heavy sedation  · Study compared to TTE of this admission and no change noted except finding of PFO  with Left to right shunting.  · A second jet of blood flow noted in the right ventricle which may be due to atrio-ventricular inflow.    Condition on Discharge:      Stable.    Code status during the hospital stay:    Code Status and Medical Interventions:   Ordered at: 02/04/24 1639     Medical Intervention Limits:    NO intubation (DNI)     Code Status (Patient has no pulse and is not breathing):    No CPR (Do Not Attempt to Resuscitate)     Medical Interventions (Patient has pulse or is breathing):    Limited Support     Discharge Disposition:    Home or Self Care    Discharge Medications:       Discharge Medications        New Medications        Instructions Start Date   fluconazole 200 MG tablet  Commonly known as: DIFLUCAN   200 mg, Oral, Every 24 Hours   Start Date: February 6, 2024     Fluticasone-Umeclidin-Vilant 100-62.5-25 MCG/ACT inhaler  Commonly known as: TRELEGY   1 puff, Inhalation, Daily - RT, Rinse mouth with water after use.      nicotine 14 MG/24HR patch  Commonly known as: NICODERM CQ   1 patch, Transdermal, Every 24 Hours      sucralfate 1 g tablet  Commonly known as: CARAFATE   1 g, Oral, 4 Times Daily Before Meals & Nightly             Continue These Medications        Instructions Start Date   albuterol sulfate  (90 Base) MCG/ACT inhaler  Commonly known as: PROVENTIL HFA;VENTOLIN HFA;PROAIR HFA   2 puffs, Inhalation, Every 4 Hours PRN      ALPRAZolam 1 MG tablet  Commonly known as: XANAX   1 mg, Oral, 2 Times Daily      amLODIPine 10 MG tablet  Commonly known as: NORVASC   10 mg, Oral, Daily      clopidogrel 75 MG tablet  Commonly known as: PLAVIX   75 mg, Oral, Daily      gabapentin 600 MG tablet  Commonly known as: NEURONTIN   600 mg, Oral, 3 Times Daily      hydroCHLOROthiazide 12.5 MG tablet  Commonly known as: HYDRODIURIL   12.5 mg, Oral, Daily PRN      HYDROcodone-acetaminophen  MG per tablet  Commonly known as: NORCO   1 tablet, Oral, Every 6 Hours PRN      meclizine 25  MG tablet  Commonly known as: ANTIVERT   25 mg, Oral, 3 Times Daily PRN      nitroglycerin 0.4 MG SL tablet  Commonly known as: NITROSTAT   No dose, route, or frequency recorded.      pantoprazole 40 MG EC tablet  Commonly known as: PROTONIX   40 mg, Oral, Every Morning      pravastatin 40 MG tablet  Commonly known as: Pravachol   80 mg, Oral, Daily      valsartan-hydrochlorothiazide 320-12.5 MG per tablet  Commonly known as: DIOVAN-HCT   No dose, route, or frequency recorded.             Discharge Diet:     Diet Instructions       Advance Diet As Tolerated -Target Diet: cardiac      Target Diet: cardiac          Activity at Discharge:       Follow-up Appointments:    Additional Instructions for the Follow-ups that You Need to Schedule       Ambulatory Referral to Disease State Management   As directed      Follow-up in 14-21 days after discharge    Order Comments: Follow-up in 14-21 days after discharge    To dept: [facility] [[id number]]   What program(s) are you referring for?: COPD   Follow-up needed: No        Discharge Follow-up with Specified Provider: Dr. Clark OR Lakisha Gamez; 4 Months   As directed      To: Dr. Clark OR Lakisha Gamez   Follow Up: 4 Months   Follow Up Details: If being discharged on a weekend, please call our office on the next working day to schedule this appointment.        Discharge Follow-up with Specified Provider: PCP; 1 Week   As directed      To: PCP   Follow Up: 1 Week        Discharge Follow-up with Specified Provider: dr watt; 2 Months   As directed      To: dr watt   Follow Up: 2 Months               Follow-up Information       Madhuri White APRN .    Specialty: Nurse Practitioner  Contact information:  [address] [place] [id number]  [phone]                           Future Appointments   Date Time Provider Department Center   2/20/2024  1:00 PM Maria Ines Phillips APRN  [facility]     Test Results Pending at Discharge:    Pending  Labs       Order Current Status    Hepatitis A Antibody, Total In process    Hepatitis B Core Antibody, Total In process               Austin Figueredo DO  02/05/24  14:24 EST    Time: I spent 45 minutes on this discharge activity which included: face-to-face encounter with the patient, reviewing the data in the system, coordination of the care with the nursing staff as well as consultants, documentation, and entering orders.     Dictated utilizing Dragon dictation.        Electronically signed by Austin Figueredo DO at 02/05/24 3105

## 2024-02-20 ENCOUNTER — DISEASE STATE MANAGEMENT VISIT (OUTPATIENT)
Dept: PHARMACY | Facility: HOSPITAL | Age: 54
End: 2024-02-20
Payer: COMMERCIAL

## 2024-02-20 VITALS
BODY MASS INDEX: 31.5 KG/M2 | DIASTOLIC BLOOD PRESSURE: 69 MMHG | WEIGHT: 196 LBS | HEART RATE: 102 BPM | SYSTOLIC BLOOD PRESSURE: 107 MMHG | OXYGEN SATURATION: 96 % | RESPIRATION RATE: 18 BRPM | HEIGHT: 66 IN

## 2024-02-20 DIAGNOSIS — Z72.0 TOBACCO USE: ICD-10-CM

## 2024-02-20 DIAGNOSIS — J44.9 CHRONIC OBSTRUCTIVE PULMONARY DISEASE, UNSPECIFIED COPD TYPE: Primary | ICD-10-CM

## 2024-02-20 DIAGNOSIS — R10.9 ABDOMINAL PAIN, UNSPECIFIED ABDOMINAL LOCATION: ICD-10-CM

## 2024-02-20 DIAGNOSIS — J43.9 PULMONARY EMPHYSEMA, UNSPECIFIED EMPHYSEMA TYPE: ICD-10-CM

## 2024-02-20 PROCEDURE — 99213 OFFICE O/P EST LOW 20 MIN: CPT | Performed by: NURSE PRACTITIONER

## 2024-02-20 PROCEDURE — G0463 HOSPITAL OUTPT CLINIC VISIT: HCPCS | Performed by: NURSE PRACTITIONER
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
## 2024-04-15 ENCOUNTER — TELEPHONE (OUTPATIENT)
Dept: PULMONOLOGY | Facility: CLINIC | Age: 54
End: 2024-04-15
Payer: COMMERCIAL

## 2024-04-15 NOTE — TELEPHONE ENCOUNTER
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  Can have a level checked at next visit. Will need to keep upcoming appointment for PFTs and further discussion. Patient should be advised of the possibility of children and grandchildren having the disease or being carriers and family members should reach out to their PCP if they wish to be tested.

## 2024-04-15 NOTE — TELEPHONE ENCOUNTER
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  Can have a level checked at next visit. Will need to keep upcoming appointment for PFTs and further discussion. Patient advised of the possibility of children and grandchildren having the disease or being carriers and family members should reach out to their PCP if they wish to be tested. Patient states understanding of information.  Patient reminded of date and time of next visit date and time.

## (undated) DEVICE — BRSH CYTO 5F 3MM 180CM STRL GRN

## (undated) DEVICE — VLV SXN AIR/H2O ORCAPOD3 1P/U STRL

## (undated) DEVICE — ST TBG HYBRID CLEVERCAP FOR OLYMPUS SCOPE 24HR

## (undated) DEVICE — CONNECT PORT ENDO CLEVERCAP FOR OLYMPUS SCOPE 24HR

## (undated) DEVICE — BITEBLOCK SCOPESAVER LG LF

## (undated) DEVICE — Device

## (undated) DEVICE — FRCP BX RADJAW4 NDL 2.8 240 STD OG